# Patient Record
Sex: MALE | URBAN - METROPOLITAN AREA
[De-identification: names, ages, dates, MRNs, and addresses within clinical notes are randomized per-mention and may not be internally consistent; named-entity substitution may affect disease eponyms.]

---

## 2018-01-01 ENCOUNTER — OFFICE VISIT (OUTPATIENT)
Dept: HEMATOLOGY/ONCOLOGY | Facility: CLINIC | Age: 83
End: 2018-01-01
Payer: MEDICARE

## 2018-01-01 ENCOUNTER — DOCUMENTATION ONLY (OUTPATIENT)
Dept: RADIATION ONCOLOGY | Facility: CLINIC | Age: 83
End: 2018-01-01

## 2018-01-01 ENCOUNTER — TELEPHONE (OUTPATIENT)
Dept: HEMATOLOGY/ONCOLOGY | Facility: CLINIC | Age: 83
End: 2018-01-01

## 2018-01-01 ENCOUNTER — TELEPHONE (OUTPATIENT)
Dept: RADIATION ONCOLOGY | Facility: CLINIC | Age: 83
End: 2018-01-01

## 2018-01-01 ENCOUNTER — CLINICAL SUPPORT (OUTPATIENT)
Dept: HEMATOLOGY/ONCOLOGY | Facility: CLINIC | Age: 83
End: 2018-01-01
Payer: MEDICARE

## 2018-01-01 ENCOUNTER — OFFICE VISIT (OUTPATIENT)
Dept: RADIATION ONCOLOGY | Facility: CLINIC | Age: 83
End: 2018-01-01
Payer: MEDICARE

## 2018-01-01 VITALS
BODY MASS INDEX: 29.92 KG/M2 | SYSTOLIC BLOOD PRESSURE: 120 MMHG | WEIGHT: 185.38 LBS | HEART RATE: 57 BPM | TEMPERATURE: 98 F | RESPIRATION RATE: 20 BRPM | DIASTOLIC BLOOD PRESSURE: 66 MMHG

## 2018-01-01 VITALS
HEART RATE: 62 BPM | DIASTOLIC BLOOD PRESSURE: 64 MMHG | RESPIRATION RATE: 20 BRPM | WEIGHT: 181.19 LBS | DIASTOLIC BLOOD PRESSURE: 61 MMHG | HEART RATE: 59 BPM | BODY MASS INDEX: 29.2 KG/M2 | SYSTOLIC BLOOD PRESSURE: 177 MMHG | BODY MASS INDEX: 29.25 KG/M2 | WEIGHT: 180.88 LBS | SYSTOLIC BLOOD PRESSURE: 167 MMHG | TEMPERATURE: 98 F

## 2018-01-01 VITALS
TEMPERATURE: 98 F | HEART RATE: 67 BPM | SYSTOLIC BLOOD PRESSURE: 148 MMHG | RESPIRATION RATE: 18 BRPM | DIASTOLIC BLOOD PRESSURE: 57 MMHG | BODY MASS INDEX: 27.92 KG/M2 | WEIGHT: 173 LBS

## 2018-01-01 VITALS
HEART RATE: 55 BPM | DIASTOLIC BLOOD PRESSURE: 65 MMHG | TEMPERATURE: 99 F | BODY MASS INDEX: 28.47 KG/M2 | SYSTOLIC BLOOD PRESSURE: 179 MMHG | WEIGHT: 176.38 LBS | RESPIRATION RATE: 18 BRPM

## 2018-01-01 VITALS
SYSTOLIC BLOOD PRESSURE: 165 MMHG | RESPIRATION RATE: 18 BRPM | BODY MASS INDEX: 27.96 KG/M2 | WEIGHT: 173.19 LBS | DIASTOLIC BLOOD PRESSURE: 65 MMHG | HEART RATE: 61 BPM | TEMPERATURE: 98 F

## 2018-01-01 VITALS
BODY MASS INDEX: 30.25 KG/M2 | SYSTOLIC BLOOD PRESSURE: 149 MMHG | DIASTOLIC BLOOD PRESSURE: 61 MMHG | TEMPERATURE: 98 F | HEART RATE: 76 BPM | RESPIRATION RATE: 20 BRPM | WEIGHT: 187.38 LBS

## 2018-01-01 DIAGNOSIS — C15.9 SQUAMOUS CELL ESOPHAGEAL CANCER: Primary | ICD-10-CM

## 2018-01-01 DIAGNOSIS — R60.0 ARM EDEMA: ICD-10-CM

## 2018-01-01 DIAGNOSIS — C15.9 SQUAMOUS CELL ESOPHAGEAL CANCER: ICD-10-CM

## 2018-01-01 DIAGNOSIS — E07.9 THYROID DYSFUNCTION: Primary | ICD-10-CM

## 2018-01-01 LAB
ALBUMIN SERPL-MCNC: 3.2 G/DL (ref 3.1–4.7)
ALBUMIN SERPL-MCNC: 3.3 G/DL (ref 3.1–4.7)
ALP SERPL-CCNC: 48 IU/L (ref 40–104)
ALP SERPL-CCNC: 49 IU/L (ref 40–104)
ALT (SGPT): 18 IU/L (ref 3–33)
ALT (SGPT): 18 IU/L (ref 3–33)
AST SERPL-CCNC: 22 IU/L (ref 10–40)
AST SERPL-CCNC: 23 IU/L (ref 10–40)
BASOPHILS NFR BLD: 0 K/UL (ref 0–0.2)
BASOPHILS NFR BLD: 0 K/UL (ref 0–0.2)
BASOPHILS NFR BLD: 0.7 %
BASOPHILS NFR BLD: 1.3 %
BILIRUB SERPL-MCNC: 0.5 MG/DL (ref 0.3–1)
BILIRUB SERPL-MCNC: 0.6 MG/DL (ref 0.3–1)
BUN SERPL-MCNC: 43 MG/DL (ref 8–20)
BUN SERPL-MCNC: 51 MG/DL (ref 8–20)
CALCIUM SERPL-MCNC: 8.7 MG/DL (ref 7.7–10.4)
CALCIUM SERPL-MCNC: 8.9 MG/DL (ref 7.7–10.4)
CHLORIDE: 96 MMOL/L (ref 98–110)
CHLORIDE: 98 MMOL/L (ref 98–110)
CO2 SERPL-SCNC: 25.8 MMOL/L (ref 22.8–31.6)
CO2 SERPL-SCNC: 28.4 MMOL/L (ref 22.8–31.6)
CREATININE: 1.66 MG/DL (ref 0.6–1.4)
CREATININE: 2.04 MG/DL (ref 0.6–1.4)
EOSINOPHIL NFR BLD: 0.1 K/UL (ref 0–0.7)
EOSINOPHIL NFR BLD: 0.1 K/UL (ref 0–0.7)
EOSINOPHIL NFR BLD: 4.2 %
EOSINOPHIL NFR BLD: 4.4 %
ERYTHROCYTE [DISTWIDTH] IN BLOOD BY AUTOMATED COUNT: 15.6 % (ref 12.5–14.5)
ERYTHROCYTE [DISTWIDTH] IN BLOOD BY AUTOMATED COUNT: 16.7 % (ref 12.5–14.5)
GLUCOSE: 130 MG/DL (ref 70–99)
GLUCOSE: 66 MG/DL (ref 70–99)
GRAN #: 1.8 K/UL (ref 1.4–6.5)
GRAN #: 2.1 K/UL (ref 1.4–6.5)
GRAN%: 56.3 %
GRAN%: 67.4 %
HCT VFR BLD AUTO: 26.9 % (ref 39–55)
HCT VFR BLD AUTO: 27.3 % (ref 39–55)
HGB BLD-MCNC: 8.9 G/DL (ref 14–16)
HGB BLD-MCNC: 9 G/DL (ref 14–16)
IMMATURE GRANS (ABS): 0 K/UL (ref 0–1)
IMMATURE GRANS (ABS): 0 K/UL (ref 0–1)
IMMATURE GRANULOCYTES: 0.3 %
IMMATURE GRANULOCYTES: 0.6 %
LYMPH #: 0.4 K/UL (ref 1.2–3.4)
LYMPH #: 0.6 K/UL (ref 1.2–3.4)
LYMPH%: 11.4 %
LYMPH%: 19 %
MCH RBC QN AUTO: 29.9 PG (ref 25–35)
MCH RBC QN AUTO: 30.3 PG (ref 25–35)
MCHC RBC AUTO-ENTMCNC: 33 G/DL (ref 31–36)
MCHC RBC AUTO-ENTMCNC: 33.1 G/DL (ref 31–36)
MCV RBC AUTO: 90.3 FL (ref 80–100)
MCV RBC AUTO: 91.9 FL (ref 80–100)
MONO #: 0.5 K/UL (ref 0.1–0.6)
MONO #: 0.6 K/UL (ref 0.1–0.6)
MONO%: 16 %
MONO%: 18.4 %
NUCLEATED RBCS: 0 %
NUCLEATED RBCS: 0 %
NUCLEATED RED BLOOD CELLS: 0 /100 WBC
NUCLEATED RED BLOOD CELLS: 0 /100 WBC
PERFORMED BY:: ABNORMAL
PERFORMED BY:: ABNORMAL
PLATELET # BLD AUTO: 109 K/UL (ref 140–440)
PLATELET # BLD AUTO: 138 K/UL (ref 140–440)
PMV BLD AUTO: 8.5 FL (ref 8.8–12.7)
PMV BLD AUTO: 8.7 FL (ref 8.8–12.7)
POTASSIUM SERPL-SCNC: 4.3 MMOL/L (ref 3.5–5)
POTASSIUM SERPL-SCNC: 4.3 MMOL/L (ref 3.5–5)
PROT SERPL-MCNC: 6.1 G/DL (ref 6–8.2)
PROT SERPL-MCNC: 6.2 G/DL (ref 6–8.2)
RBC # BLD AUTO: 2.97 M/UL (ref 4.3–5.9)
RBC # BLD AUTO: 2.98 M/UL (ref 4.3–5.9)
SODIUM: 132 MMOL/L (ref 134–144)
SODIUM: 133 MMOL/L (ref 134–144)
T4 FREE SP9 P CHAL SERPL-SCNC: 0.66 NG/DL (ref 0.45–1.27)
TSH SERPL DL<=0.005 MIU/L-ACNC: 7.56 ULU/ML (ref 0.3–5.6)
WBC # BLD: 3.1 K/UL (ref 5–10)
WBC # BLD: 3.2 K/UL (ref 5–10)

## 2018-01-01 PROCEDURE — 99214 OFFICE O/P EST MOD 30 MIN: CPT | Mod: ,,, | Performed by: INTERNAL MEDICINE

## 2018-01-01 PROCEDURE — 99215 OFFICE O/P EST HI 40 MIN: CPT | Mod: ,,, | Performed by: RADIOLOGY

## 2018-01-01 RX ORDER — AMLODIPINE BESYLATE 10 MG/1
10 TABLET ORAL DAILY
COMMUNITY

## 2018-01-01 RX ORDER — HEPARIN 100 UNIT/ML
500 SYRINGE INTRAVENOUS
Status: CANCELLED | OUTPATIENT
Start: 2018-01-01

## 2018-01-01 RX ORDER — SULFAMETHOXAZOLE AND TRIMETHOPRIM 400; 80 MG/1; MG/1
1 TABLET ORAL 2 TIMES DAILY
Qty: 20 TABLET | Refills: 0 | Status: SHIPPED | OUTPATIENT
Start: 2018-01-01 | End: 2018-01-01

## 2018-01-01 RX ORDER — SODIUM CHLORIDE 0.9 % (FLUSH) 0.9 %
10 SYRINGE (ML) INJECTION
Status: CANCELLED | OUTPATIENT
Start: 2018-01-01

## 2018-01-01 RX ORDER — HYDROCODONE BITARTRATE AND ACETAMINOPHEN 7.5; 325 MG/1; MG/1
1 TABLET ORAL
Qty: 60 TABLET | Refills: 0 | Status: SHIPPED | OUTPATIENT
Start: 2018-01-01

## 2018-04-30 ENCOUNTER — TELEPHONE (OUTPATIENT)
Dept: RADIATION ONCOLOGY | Facility: CLINIC | Age: 83
End: 2018-04-30

## 2018-04-30 ENCOUNTER — OFFICE VISIT (OUTPATIENT)
Dept: RADIATION ONCOLOGY | Facility: CLINIC | Age: 83
End: 2018-04-30
Payer: MEDICARE

## 2018-04-30 VITALS — BODY MASS INDEX: 28.89 KG/M2 | WEIGHT: 190 LBS | DIASTOLIC BLOOD PRESSURE: 53 MMHG | SYSTOLIC BLOOD PRESSURE: 133 MMHG

## 2018-04-30 DIAGNOSIS — C15.9 SQUAMOUS CELL ESOPHAGEAL CANCER: ICD-10-CM

## 2018-04-30 DIAGNOSIS — C77.0 SECONDARY MALIGNANT NEOPLASM OF CERVICAL LYMPH NODE: Primary | ICD-10-CM

## 2018-04-30 PROCEDURE — 99205 OFFICE O/P NEW HI 60 MIN: CPT | Mod: ,,, | Performed by: RADIOLOGY

## 2018-04-30 RX ORDER — POTASSIUM CHLORIDE 750 MG/1
10 CAPSULE, EXTENDED RELEASE ORAL ONCE
COMMUNITY

## 2018-04-30 RX ORDER — FUROSEMIDE 20 MG/1
20 TABLET ORAL 2 TIMES DAILY
COMMUNITY

## 2018-04-30 RX ORDER — HYDRALAZINE HYDROCHLORIDE 50 MG/1
50 TABLET, FILM COATED ORAL 3 TIMES DAILY
COMMUNITY

## 2018-04-30 RX ORDER — ASPIRIN 81 MG/1
81 TABLET ORAL DAILY
COMMUNITY

## 2018-04-30 RX ORDER — FENOFIBRATE 160 MG/1
160 TABLET ORAL DAILY
COMMUNITY

## 2018-04-30 RX ORDER — CLONIDINE 0.2 MG/24H
1 PATCH, EXTENDED RELEASE TRANSDERMAL
COMMUNITY

## 2018-04-30 RX ORDER — TERAZOSIN 5 MG/1
5 CAPSULE ORAL NIGHTLY
COMMUNITY

## 2018-04-30 RX ORDER — HYDROCODONE BITARTRATE AND ACETAMINOPHEN 7.5; 325 MG/1; MG/1
1 TABLET ORAL
Qty: 60 TABLET | Refills: 0 | Status: SHIPPED | OUTPATIENT
Start: 2018-04-30 | End: 2018-05-16 | Stop reason: SDUPTHER

## 2018-04-30 RX ORDER — CITALOPRAM 20 MG/1
20 TABLET, FILM COATED ORAL DAILY
COMMUNITY

## 2018-04-30 RX ORDER — ESOMEPRAZOLE MAGNESIUM 40 MG/1
40 CAPSULE, DELAYED RELEASE ORAL
COMMUNITY

## 2018-04-30 RX ORDER — SPIRONOLACTONE 50 MG/1
50 TABLET, FILM COATED ORAL DAILY
COMMUNITY

## 2018-04-30 RX ORDER — ALPRAZOLAM 0.25 MG/1
0.25 TABLET ORAL 3 TIMES DAILY
COMMUNITY

## 2018-04-30 RX ORDER — INSULIN LISPRO 100 [IU]/ML
INJECTION, SOLUTION INTRAVENOUS; SUBCUTANEOUS
COMMUNITY

## 2018-04-30 NOTE — PATIENT INSTRUCTIONS
Patient given education and instructions on dental care. Radiation therapy to the Head and neck instruction sheet given along with skin care  Instructions.   GALLITO booklet given.  Patient and spouse verbalized understanding.

## 2018-04-30 NOTE — PROGRESS NOTES
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 Mg for sinus problems and a left-sided neck mass was discovered. He underwent a fine-needle aspirate of the neck mass making a diagnosis squamous cell carcinoma.  He presented to Dr. Foy, for workup which included a PET scan dated 04/25/2018. This study revealed multiple lymph nodes in the neck specifically to large hypermetabolic level II nodes consistent with neoplastic disease a CT scan was also performed which showed lymphadenopathy at level IB measuring 2.8 cm level II measuring 3.5 cm and level III measuring 2.5 cm. The PET scan also confirmed a soft tissue intraluminal esophageal mass at the C6-C7 level measuring 3.4 x 2.9 cm with an SUV of 9.9 consistent with a neoplastic process.  There is no evidence of distant metastatic disease.    Clinically he is able to eat a fairly normal diet. He does not have any significant symptoms of dysphasia, no coughing up blood, moderate pain in the posterior head area possibly referred, requiring hydrocodone 7.5. He has noted that the neck mass has increased in size in a short period of time.  He doesn't describe any shortness of breath fevers chills or cough. He has borderline end-stage renal disease but not on dialysis.      Review of Systems   Constitutional: Negative for appetite change and chills.   HENT:   Positive for lump/mass. Negative for mouth sores.    Eyes: Negative for eye problems and icterus.   Respiratory: Negative for chest tightness and cough.    Cardiovascular: Negative for chest pain and leg swelling.   Gastrointestinal: Negative for abdominal distention and abdominal pain.    Genitourinary: Negative for difficulty urinating and dysuria.    Musculoskeletal: Positive for back pain and neck pain. Negative for arthralgias.   Skin: Negative for itching and rash.   Neurological: Negative for extremity weakness and speech difficulty.   Hematological: Negative for adenopathy. Does not bruise/bleed easily.   Psychiatric/Behavioral: Negative for confusion. The patient is not nervous/anxious.      Past Medical History:   Diagnosis Date    Anemia     Arthritis     Cancer     neck    CHF (congestive heart failure)     Diabetes mellitus     Disorder of kidney and ureter      Past Surgical History:   Procedure Laterality Date    APPENDECTOMY      BOWEL RESECTION      CORONARY ANGIOPLASTY WITH STENT PLACEMENT      INGUINAL HERNIA REPAIR      NH CABG, ARTERY-VEIN, FOUR      Coronary Artery Bypass, 4    PROSTATE SURGERY       Social History     Social History    Marital status:      Spouse name: N/A    Number of children: N/A    Years of education: N/A     Social History Main Topics    Smoking status: Never Smoker    Smokeless tobacco: Never Used    Alcohol use No    Drug use: Unknown    Sexual activity: Not Asked     Other Topics Concern    None     Social History Narrative    None     History reviewed. No pertinent family history.    PRIOR HISTORY OF CHEMOTHERAPY OR RADIOTHERAPY: Please see HPI for patients prior oncologic history.    Medication List with Changes/Refills   Current Medications    ALPRAZOLAM (XANAX) 0.25 MG TABLET    Take 0.25 mg by mouth 3 (three) times daily.    ASPIRIN (ECOTRIN) 81 MG EC TABLET    Take 81 mg by mouth once daily.    ATORVASTATIN CALCIUM (LIPITOR ORAL)    Take by mouth.    CITALOPRAM (CELEXA) 20 MG TABLET    Take 20 mg by mouth once daily.    CLONIDINE 0.2 MG/24 HR TD PTWK (CATAPRES) 0.2 MG/24 HR    Place 1 patch onto the skin every 7 days.    EPOETIN JOE (PROCRIT) 10,000 UNIT/ML INJECTION    Inject into the skin.    ESOMEPRAZOLE (NEXIUM)  40 MG CAPSULE    Take 40 mg by mouth before breakfast.    FENOFIBRATE 160 MG TAB    Take 160 mg by mouth once daily.    FUROSEMIDE (LASIX) 20 MG TABLET    Take 20 mg by mouth 2 (two) times daily.    HYDRALAZINE (APRESOLINE) 50 MG TABLET    Take 50 mg by mouth 3 (three) times daily.    INSULIN LISPRO (HUMALOG) 100 UNIT/ML INJECTION    Inject into the skin 3 (three) times daily before meals.    LINAGLIPTIN (TRADJENTA) 5 MG TAB TABLET    Take 5 mg by mouth once daily.    POTASSIUM CHLORIDE (MICRO-K) 10 MEQ CPSR    Take 10 mEq by mouth once.    SPIRONOLACTONE (ALDACTONE) 50 MG TABLET    Take 50 mg by mouth once daily.    TERAZOSIN (HYTRIN) 5 MG CAPSULE    Take 5 mg by mouth every evening.    TRAVOPROST, BENZALKONIUM, (TRAVATAN) 0.004 % OPHTHALMIC SOLUTION    1 drop every evening.     Review of patient's allergies indicates:   Allergen Reactions    Ceftin [cefuroxime axetil]     Codeine     Levaquin [levofloxacin]     Prednisone     Remeron [mirtazapine]     Ultracet [tramadol-acetaminophen]        QUALITY OF LIFE: 80%- Normal Activity with Effort: Some Symptoms of Disease    Vitals:    04/30/18 1356   BP: (!) 133/53   Weight: 86.2 kg (190 lb)   PainSc: 0-No pain       PHYSICAL EXAM:   GENERAL: alert; in no apparent distress.   HEAD: normocephalic, atraumatic.  Neck- large palpable mass in the right neck level II measuring approxi-4 cm  EYES: pupils are equal, round, reactive to light and accommodation. Sclera anicteric. Conjunctiva not injected.   NOSE/THROAT: no nasal erythema or rhinorrhea. Oropharynx pink, without erythema, ulcerations or thrush.   NECK: no cervical motion rigidity; supple with no masses.  CHEST: clear to auscultation bilaterally; no wheezes, crackles or rubs. Patient is speaking comfortably on room air with normal work of breathing without using accessory muscles of respiration.  CARDIOVASCULAR: regular rate and rhythm; no murmurs, rubs or gallops.  ABDOMEN: soft, nontender, nondistended.  Bowel sounds present.   MUSCULOSKELETAL: no tenderness to palpation along the spine or scapulae. Normal range of motion.  NEUROLOGIC: cranial nerves II-XII intact bilaterally. Strength 5/5 in bilateral upper and lower extremities. No sensory deficits appreciated. Reflexes globally intact. No cerebellar signs. Normal gait.  LYMPHATIC: no cervical, supraclavicular or axillary adenopathy appreciated bilaterally.   EXTREMITIES: no clubbing, cyanosis, edema.  SKIN: no erythema, rashes, ulcerations noted.     REVIEW OF IMAGING/PATHOLOGY/LABS: Please see HPI. All images reviewed personally by dictating physician.     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 It appears he does have a proximal esophageal tumor noting that the SUV on PET scan was almost 10, and lesion measuring almost 3-1/2 cm, noting that it has significantly increased in size of a short period of time.  I believe it has metastasized to his neck nodes.  To complete his workup I do recommend the followin medical oncology referral  2 GI referral for investigation of the proximal esophagus with biopsy and possible for placement of PEG tube, noting that his esophagus could close off.  3 dental evaluation, for dental clearance prior to radiation therapy    Because of his age and comorbidities his systemic options may be limited however we will go ahead make referral for and be seen by one of our medical oncologist.     I had a very careful discussion about the side effects of treatment including not limited to early effects of soreness of the skin and throat requiring diet supplementation through PEG tube placement, changes in the oral cavity such as permanent dryness because of the  effect on salivary glands affecting his dental health and henceforth dental clearance to be done prior to treatment.  I discussed long-term effects of treatment including scar tissue discoloration of the skin tightening of the tissues of the skin and neck, possible scarring of the esophagus and possibly long-term if not permanent PEG tube nutritional support requirement.    In terms of radiation program I do recommend treatment to bilateral necks with PET-positive disease receiving 70 gray uninvolved lymph nodes bilaterally receiving approximate 50-56 gray, and esophageal mass I do recommend treatment to 50-60 gray.    Addendum: The patient has undergone dental clearance. In addition he was seen by Dr. Arreguin, and a PEG tube has been placed.  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      We discussed the risks and benefits of the above treatment and have gone over in detail the acute and late toxicities of radiation therapy to the neck and esoph. The patient expressed  understanding and has signed a consent form which is included in the patients chart. The patient has our contact information and understands that they are free to contact us at any time with questions or concerns regarding radiation therapy.    DISPOSITION: RTC FOR CT SIM    TIME SPENT WITH PATIENT: I have personally seen and evaluated this patient. Greater than 50% of this time was spent discussing coordination of care and/or counseling.     PHYSICIAN: Andrews Denis MD

## 2018-05-08 ENCOUNTER — TELEPHONE (OUTPATIENT)
Dept: RADIATION ONCOLOGY | Facility: CLINIC | Age: 83
End: 2018-05-08

## 2018-05-08 ENCOUNTER — DOCUMENTATION ONLY (OUTPATIENT)
Dept: RADIATION ONCOLOGY | Facility: CLINIC | Age: 83
End: 2018-05-08

## 2018-05-08 NOTE — TELEPHONE ENCOUNTER
DIETITIAN INTRODUCTION    Called wife yesterday to introduce myself and let her know that I would be closely working with she and her  during treatment.    He got a feeding tube and his wife has been taught by [facility] RN on how to flush and clean around the PEG site.  She feels comfortable flushing PEG.      At this time, Mr. Stanton is able to eat solid foods and ate a steak yesterday with no difficulty.  He is a diabetic and blood sugars run between 130 - 160 per his wife.  He has stage 4 CRF.    Plan: Gave wife RD contact information.  2. Will meet with wife and Mr. Stanton after simulation tomorrow.  3. Will send referral to [employer] for tube feeding supplies.  4. Advised wife to have patient overeat before treatment by adding more fat to the diet in the way of cream, butter, avocados, oils and contreras.

## 2018-05-08 NOTE — PROGRESS NOTES
Con Stanton  [id number]  8/14/1930 5/8/2018  No referring provider defined for this encounter.    DIAGNOSIS: Metastatic esophageal proximal squamous cell carcinoma  TREATMENT SITE(S): Proximal esophagus and bilateral neck nodes    INTENT: CURATIVE    TREATMENT SETTING: RT ALONE     MODALITY: PHOTON    TECHNIQUE:  INTENSITY MODULATED RADIOTHERAPY (IMRT)    IMRT MEDICAL NECESSITY:IMRT MEDICAL NECESSITY: Target volume irregular shape/proximate to critical structures, Narrow margins needed to protect adjacent structures and High precision necessary     HPI: 87-year-old gentleman with squamous cell cancer of the proximal esophagus with adenopathy of the neck, bulky on the left, with significant comorbidities, including borderline end-stage renal disease    I have personally performed treatment planning for the patient, reviewing relevant history/physical and imaging. I have defined GTV, CTV, PTV and organs at risk.     In order to accomplish this plan, I am ordering:  SIMULATION: CT SIMULATION FOR PLACEMENT OF TREATMENT FIELDS    CONTRAST: none    TO ACCOMPLISH REPRODUCIBLE POSITION: AQUAPLAST MASK    DEVICES FOR BEAM SHAPING: CUSTOMIZED MLC    CUSTOMIZED BOLUS: none    IMAGING: CBCT DAILY    I have ordered a weekly physics check.  SPECIAL PHYSICS CONSULT: NO  REASON: N/A    SPECIAL TREATMENT CIRCUMSTANCE: NO   Concurrent or recent administration of chemotherapeutic agents which are  known potent radiosensitizers and thus will require vigilant monitoring for  exaggerated radiation toxicities.    LABS: NONE    ANTICIPATED PRESCRIPTION: 70 grade PET positive left neck nodes, 56 gray to bilateral uninvolved neck nodes, 60 gray to primary tumor site and proximal esophagus    TREATMENT: DAILY    PHYSICIAN: Andrews Denis MD

## 2018-05-09 ENCOUNTER — OFFICE VISIT (OUTPATIENT)
Dept: HEMATOLOGY/ONCOLOGY | Facility: CLINIC | Age: 83
End: 2018-05-09
Payer: MEDICARE

## 2018-05-09 VITALS
HEIGHT: 68 IN | DIASTOLIC BLOOD PRESSURE: 72 MMHG | BODY MASS INDEX: 28.62 KG/M2 | WEIGHT: 188.88 LBS | SYSTOLIC BLOOD PRESSURE: 156 MMHG | RESPIRATION RATE: 18 BRPM | TEMPERATURE: 98 F | HEART RATE: 83 BPM

## 2018-05-09 DIAGNOSIS — C15.9 SQUAMOUS CELL ESOPHAGEAL CANCER: Primary | ICD-10-CM

## 2018-05-09 PROCEDURE — 99204 OFFICE O/P NEW MOD 45 MIN: CPT | Mod: ,,, | Performed by: INTERNAL MEDICINE

## 2018-05-09 NOTE — PROGRESS NOTES
[facility] Hematolgy/Oncology  History & Physical    Subjective:      Patient ID:   NAME: Con Stanton : 1930     87 y.o. male    Referring Doc: Andrews Denis  Other Physicians: Sonja ([facility]-ENT), Soto Mg (ENT); Regan Mahajan (PCP); Caroline Toledo        Chief Complaint: newly diagnosed metastatic squamous cell esophageal CA      HPI:  87 y.o. male with diagnosis of newly diagnosed metastatic squamous cell esophageal CA  who has been referred by Dr Andrews Denis for evaluation by medical hematology/oncology. He originally presented with an enlarging neck mass and was seen by Dr Mg and had an FNA which showed metastatic squamous cell carcinoma. He subsequently saw Dr Casillas at [facility] ENT and had laryngoscope which was essentially unremarkable. PET scan was done on 2018 with discovery of an upper esophageal primary mass.  He is of advanced age and has numerous co-morbidities including ESRD and CHF. He saw Dr Arreguin with GI has had EGD on Friday with peg tube placement and biopsy of the mass. He is here with his wife, two sons and daughter-in-law. He denies any CP, SOB, HA's or N/V. I discussed the poor prognostics with regard to esophageal cancers in general with the patient and his family.               ROS:   GEN: normal without any fever, night sweats or weight loss  HEENT: normal with no HA's, sore throat, stiff neck, changes in vision  CV: normal with no CP, SOB, PND, SCHUMACHER or orthopnea  PULM: normal with no SOB, cough, hemoptysis, sputum or pleuritic pain  GI: normal with no abdominal pain, nausea, vomiting, constipation, diarrhea, melanotic stools, BRBPR, or hematemesis  : normal with no hematuria, dysuria  BREAST: normal with no mass, discharge, pain  SKIN: normal with no rash, erythema, bruising, or swelling       Past Medical/Surgical History:  Past Medical History:   Diagnosis Date    Anemia     Arthritis     Cancer     neck    CHF (congestive heart failure)      Diabetes mellitus     Disorder of kidney and ureter      Past Surgical History:   Procedure Laterality Date    APPENDECTOMY      BOWEL RESECTION      CORONARY ANGIOPLASTY WITH STENT PLACEMENT      INGUINAL HERNIA REPAIR      CT CABG, ARTERY-VEIN, FOUR      Coronary Artery Bypass, 4    PROSTATE SURGERY           Allergies:  Review of patient's allergies indicates:   Allergen Reactions    Ceftin [cefuroxime axetil]     Codeine     Levaquin [levofloxacin]     Prednisone     Remeron [mirtazapine]     Ultracet [tramadol-acetaminophen]        Social/Family History:  Social History     Social History    Marital status:      Spouse name: N/A    Number of children: N/A    Years of education: N/A     Occupational History    Not on file.     Social History Main Topics    Smoking status: Never Smoker    Smokeless tobacco: Never Used    Alcohol use No    Drug use: Unknown    Sexual activity: Not on file     Other Topics Concern    Not on file     Social History Narrative    No narrative on file     No family history on file.      Medications:    Current Outpatient Prescriptions:     ALPRAZolam (XANAX) 0.25 MG tablet, Take 0.25 mg by mouth 3 (three) times daily., Disp: , Rfl:     aspirin (ECOTRIN) 81 MG EC tablet, Take 81 mg by mouth once daily., Disp: , Rfl:     atorvastatin calcium (LIPITOR ORAL), Take by mouth., Disp: , Rfl:     citalopram (CELEXA) 20 MG tablet, Take 20 mg by mouth once daily., Disp: , Rfl:     cloNIDine 0.2 mg/24 hr td ptwk (CATAPRES) 0.2 mg/24 hr, Place 1 patch onto the skin every 7 days., Disp: , Rfl:     epoetin sunitha (PROCRIT) 10,000 unit/mL injection, Inject into the skin., Disp: , Rfl:     esomeprazole (NEXIUM) 40 MG capsule, Take 40 mg by mouth before breakfast., Disp: , Rfl:     fenofibrate 160 MG Tab, Take 160 mg by mouth once daily., Disp: , Rfl:     furosemide (LASIX) 20 MG tablet, Take 20 mg by mouth 2 (two) times daily., Disp: , Rfl:     hydrALAZINE  "(APRESOLINE) 50 MG tablet, Take 50 mg by mouth 3 (three) times daily., Disp: , Rfl:     hydrocodone-acetaminophen 7.5-325mg (NORCO) 7.5-325 mg per tablet, Take 1 tablet by mouth every 4 to 6 hours as needed for Pain., Disp: 60 tablet, Rfl: 0    insulin lispro (HUMALOG) 100 unit/mL injection, Inject into the skin 3 (three) times daily before meals., Disp: , Rfl:     linagliptin (TRADJENTA) 5 mg Tab tablet, Take 5 mg by mouth once daily., Disp: , Rfl:     potassium chloride (MICRO-K) 10 MEQ CpSR, Take 10 mEq by mouth once., Disp: , Rfl:     spironolactone (ALDACTONE) 50 MG tablet, Take 50 mg by mouth once daily., Disp: , Rfl:     terazosin (HYTRIN) 5 MG capsule, Take 5 mg by mouth every evening., Disp: , Rfl:     travoprost, benzalkonium, (TRAVATAN) 0.004 % ophthalmic solution, 1 drop every evening., Disp: , Rfl:       Pathology:    Left Neck FNA: 4/10/2018  FINAL DIAGNOSIS:  LEFT CERVICAL (NECK) MASS, NEEDLE BIOPSY:    - MODERATELY DIFFERENTIATED SQUAMOUS CELL CARCINOMA.      Objective:   Vitals:  Blood pressure (!) 156/72, pulse 83, temperature 98.4 °F (36.9 °C), resp. rate 18, height 5' 8" (1.727 m), weight 85.7 kg (188 lb 14.4 oz).    Physical Examination:   GEN: no apparent distress, comfortable; AAOx3  HEAD: atraumatic and normocephalic  EYES: no pallor, no icterus, PERRLA  ENT: OMM, no pharyngeal erythema, external ears WNL; no nasal discharge; no thrush  NECK: positive mass/LN; thyroid normal, trachea midline, no LAD/LN's, supple  CV: RRR with II/IV OCHOA murmur; normal pulse; normal S1 and S2; no pedal edema  CHEST: Normal respiratory effort; CTAB; normal breath sounds; no wheeze or crackles  ABDOM: nontender and nondistended; soft; normal bowel sounds; no rebound/guarding  MUSC/Skeletal: arthropathy  EXTREM: no clubbing, cyanosis, inflammation or swelling  SKIN: no rashes, lesions, ulcers, petechiae or subcutaneous nodules  : no gregg  NEURO: grossly intact; motor/sensory WNL; AAOx3; no " tremors  PSYCH: normal mood, affect and behavior  LYMPH: normal cervical, supraclavicular, axillary and groin LN's      Labs:     None available currently      Radiology/Diagnostic Studies:    CT and PET on chart      All lab results and imaging results have been reviewed and discussed with the patient    Assessment:   (1) 87 y.o. male with diagnosis of newly diagnosed metastatic squamous cell esophageal CA  who has been referred by Dr Andrews Denis for evaluation by medical hematology/oncology. He originally presented with an enlarging neck mass and was seen by Dr Mg and had an FNA which showed metastatic squamous cell carcinoma. He subsequently saw Dr Casillas at [facility] ENT and had laryngoscope which was essentially unremarkable. PET scan was done on 4/25/2018 with discovery of an upper esophageal primary mass which is suspected primary origin.      - CT scan was also performed which showed lymphadenopathy at level IB measuring 2.8 cm level II measuring 3.5 cm and level III measuring 2.5 cm. The PET scan also confirmed a soft tissue intraluminal esophageal mass at the C6-C7 level measuring 3.4 x 2.9 cm with an SUV of 9.9 consistent with a neoplastic process.    - he had EGD this past Friday with Dr Arreguin and had biopsy of the esophageal mass and peg tube placed    - He is of advanced age and has numerous co-morbidities including ESRD and CHF. I do not feel he would be a good candidate for chemotherapy or combined modality therapy. Immunologic therapy with Opdivo or Keytruda has not been approved for first line use in Esophageal Squamous Cell carcinomas, but will re-evaluate post monotherapy XRT.     - I reviewed the latest NCCN data version 1.2018    (2) ESRD - no on HD - followed by Dr Winkler    (3) DM    (4) CHF and CAD s/p CABG x4 in past - followed by Dr Pepper  - he saw Dr Pepper last week    (5) Chronic anemia - most likely multifactorial process with underlying anemia of chronic disorders and  anemia of chronic renal disease at least  - he is on procrit    (6) Urologic/prostate issues - followed by Dr Toledo - ureter issues; elevated PSA            Plan:       Squamous cell esophageal cancer            PLAN:  1. Await biopsy report from latest EGD done this past Friday   2. Proceed with monotherapy with XRT for now  3. Re-evaluate any possible consideration for immunologics afterwards  4. RTC in  4-6 weeks   Fax note to Keyshawn Denis Tveit, Morales, Neitzschman      I reviewed and discussed the pathology report(s) in depth with the patient and went over the patient's individual diagnosis based on the information that was currently available. I discussed the TNM staging process with regard to the patient's particular cancer type, and the calculated stage based on the currently available TNM data. I discussed the available prognostic data with regard to the current staging information and how it relates to the prognosis of their particular neoplastic process.        I have explained and the patient understands all of  the current recommendation(s). I have answered all of their questions to the best of my ability and to their complete satisfaction.             Thank you for allowing me to participate in this patient's care. Please call with any questions or concerns.    Electronically signed Huey Nieto MD

## 2018-05-10 ENCOUNTER — TELEPHONE (OUTPATIENT)
Dept: HEMATOLOGY/ONCOLOGY | Facility: CLINIC | Age: 83
End: 2018-05-10

## 2018-05-10 ENCOUNTER — DOCUMENTATION ONLY (OUTPATIENT)
Dept: RADIATION ONCOLOGY | Facility: CLINIC | Age: 83
End: 2018-05-10

## 2018-05-10 NOTE — PROGRESS NOTES
NUTRITION NOTE    Mr. Stanton was here yesterday with his son and wife for his simulation.  He continues to eat well and has not lost any weight.  At this time he is only getting radiation.  Weight: 188#  He recently had some bouts with narcotic induced constipation.  He has several co- morbidities including stage 3 CRI, CHF and diabetes.  He is being followed by a speech therapist and will share his cancer diagnosis with her.       Plan: 1. Educated on the Pre-renal diet since he has a history of stage 4 CRI.  Advised that he be mindful of not overeating protein foods and limit protein to 60 -70 grams daily.  Reminded to continue to avoid using salt and high sodium foods.  2. Discussed the importance of nutrition and advised he aim for  2200 - 2600 calories daily.  Advised he should add healthy fats to get in extra calories.  He may need to eat cream, butter, gravies and contreras, for calories if he begins to lose weight  3. Advised he aim for 7-8 cups of fluid daily (25cc/kg adjusted body weight, hx of CHF).  4. Educated on the Fiber One/Senokot-S protocol for constipation.  Advised he could take Miralax daily if he continues with constipation.  5. Gave list of soft food ideas and blenderized recipes.  6. Advised to use baking soda/salt water rinses.  7. Will send referral for Lymphedema screening.   8. Follow with [facility] home health speech therapist, may need a baseline barium swallow study.  9. Since he is diabetic advised that he check his blood sugars more regularly.  Reviewed treatment for hypoglycemia.

## 2018-05-10 NOTE — TELEPHONE ENCOUNTER
Called pts family to tell them Dr. claros got final path report and that it was he thought it was

## 2018-05-16 DIAGNOSIS — C15.9 SQUAMOUS CELL ESOPHAGEAL CANCER: ICD-10-CM

## 2018-05-16 RX ORDER — HYDROCODONE BITARTRATE AND ACETAMINOPHEN 7.5; 325 MG/1; MG/1
1 TABLET ORAL
Qty: 60 TABLET | Refills: 0 | Status: SHIPPED | OUTPATIENT
Start: 2018-05-16 | End: 2018-06-15 | Stop reason: SDUPTHER

## 2018-05-22 ENCOUNTER — DOCUMENTATION ONLY (OUTPATIENT)
Dept: RADIATION ONCOLOGY | Facility: CLINIC | Age: 83
End: 2018-05-22

## 2018-05-22 NOTE — PROGRESS NOTES
NUTRITION NOTE    Mr. Stanton got his 4th radiation treatment today.  He has no eating issues other that a sore throat he got from being an overachiever with his swallowing exercises he was taught by his speech therapist.  Continues to manage his fluid intake by weighing regularly and taking diuretics per protocol given by his cardiologist.  His fluid needs are for 7-8 cups/day = 1850 cc (25cc/kg. adjusted body weight). Weight is stable at 189.5#.      Plan: Advised that he needed to overeat in anticipation of losing weight and while he can still taste food.  2. Advised he add fat liberally for calories to his foods.  3. Continue to flush PEG twice daily.  4. Called his Home Health Outpatient speech therapist and she states that he has not had a swallow study because the bedside swallow study shows no indications that warrant need for the study.  She will keep me posted as to how he is swallowing and  when she feels he may need an MBBS.  5. Will send in referral for tube feeding formula when he needs supplies.

## 2018-05-24 DIAGNOSIS — C15.9 SQUAMOUS CELL ESOPHAGEAL CANCER: Primary | ICD-10-CM

## 2018-05-24 RX ORDER — LIDOCAINE HYDROCHLORIDE 20 MG/ML
SOLUTION OROPHARYNGEAL EVERY 4 HOURS
Qty: 100 ML | Refills: 5 | Status: SHIPPED | OUTPATIENT
Start: 2018-05-24 | End: 2018-07-12 | Stop reason: SDUPTHER

## 2018-05-29 ENCOUNTER — TELEPHONE (OUTPATIENT)
Dept: HEMATOLOGY/ONCOLOGY | Facility: CLINIC | Age: 83
End: 2018-05-29

## 2018-05-29 DIAGNOSIS — C15.9 SQUAMOUS CELL ESOPHAGEAL CANCER: Primary | ICD-10-CM

## 2018-05-30 DIAGNOSIS — L58.9 RADIATION DERMATITIS: Primary | ICD-10-CM

## 2018-05-30 LAB
ALBUMIN SERPL-MCNC: 3.1 G/DL (ref 3.1–4.7)
ALP SERPL-CCNC: 33 IU/L (ref 40–104)
ALT (SGPT): 22 IU/L (ref 3–33)
AST SERPL-CCNC: 29 IU/L (ref 10–40)
BASOPHILS NFR BLD: 0 K/UL (ref 0–0.2)
BASOPHILS NFR BLD: 0.8 %
BILIRUB SERPL-MCNC: 0.4 MG/DL (ref 0.3–1)
BUN SERPL-MCNC: 38 MG/DL (ref 8–20)
CALCIUM SERPL-MCNC: 9 MG/DL (ref 7.7–10.4)
CHLORIDE: 97 MMOL/L (ref 98–110)
CO2 SERPL-SCNC: 25.3 MMOL/L (ref 22.8–31.6)
CREATININE: 1.6 MG/DL (ref 0.6–1.4)
EOSINOPHIL NFR BLD: 0.1 K/UL (ref 0–0.7)
EOSINOPHIL NFR BLD: 2.5 %
ERYTHROCYTE [DISTWIDTH] IN BLOOD BY AUTOMATED COUNT: 14.3 % (ref 12.5–14.5)
GLUCOSE: 160 MG/DL (ref 70–99)
GRAN #: 3.6 K/UL (ref 1.4–6.5)
GRAN%: 75.6 %
HCT VFR BLD AUTO: 32.9 % (ref 39–55)
HGB BLD-MCNC: 10.6 G/DL (ref 14–16)
IMMATURE GRANS (ABS): 0 K/UL (ref 0–1)
IMMATURE GRANULOCYTES: 0.2 %
LYMPH #: 0.5 K/UL (ref 1.2–3.4)
LYMPH%: 9.6 %
MCH RBC QN AUTO: 28 PG (ref 25–35)
MCHC RBC AUTO-ENTMCNC: 32.2 G/DL (ref 31–36)
MCV RBC AUTO: 86.8 FL (ref 80–100)
MONO #: 0.5 K/UL (ref 0.1–0.6)
MONO%: 11.3 %
NUCLEATED RBCS: 0 %
NUCLEATED RED BLOOD CELLS: 0 /100 WBC
PERFORMED BY:: ABNORMAL
PLATELET # BLD AUTO: 172 K/UL (ref 140–440)
PMV BLD AUTO: 8.7 FL (ref 8.8–12.7)
POTASSIUM SERPL-SCNC: 4.9 MMOL/L (ref 3.5–5)
PROT SERPL-MCNC: 6.4 G/DL (ref 6–8.2)
RBC # BLD AUTO: 3.79 M/UL (ref 4.3–5.9)
SODIUM: 130 MMOL/L (ref 134–144)
WBC # BLD: 4.8 K/UL (ref 5–10)

## 2018-05-31 ENCOUNTER — DOCUMENTATION ONLY (OUTPATIENT)
Dept: RADIATION ONCOLOGY | Facility: CLINIC | Age: 83
End: 2018-05-31

## 2018-05-31 NOTE — PROGRESS NOTES
NUTRITION FOLLOW UP    Mr. Stanton got his 11th out of 33 radiation treatments today.  He complains of jaw pain and mucositis.  He is not eating well and his weight is down 4# at 185.5%. He is also very fatigued.  He was in the hospital this week because of     Plan: Advised on 5/29  that he is losing weight and it is time to use his PEG. 2. Advised that he will not be as fatigued if he is not losing weight. 3. Reviewed how to use baking soda/salt rinses to help with mucositis. 4.Recommended he try Oral Balance.

## 2018-05-31 NOTE — PROGRESS NOTES
TUBE FEEDING - NUTRITION NOTE    Problem: CRF and diabetes, obtaining the appropriate formula.    Plan: Would recommend Suplena as tube feeding of choice, because it is recommended for kidney patients not on dialysis and it is suitable for diabetics.  However because it is a specialized tube feeding and more difficult to obtain, will try Osmolite 1.5 and Glucena first and monitor labs and blood sugars.    2. Gave son tube feeding schedule to try.  3. Advised to check blood sugars more regularly.  4. Should he lose weight and/or labs become elevated with these formulas will need to change to Suplena,  It has 425 calories/can, which is also an advantage to this formula.  5.  Follow up next week.

## 2018-06-06 DIAGNOSIS — C15.9 SQUAMOUS CELL ESOPHAGEAL CANCER: Primary | ICD-10-CM

## 2018-06-06 RX ORDER — HYDROCODONE BITARTRATE AND ACETAMINOPHEN 7.5; 325 MG/15ML; MG/15ML
15 SOLUTION ORAL
Qty: 750 ML | Refills: 0 | Status: SHIPPED | OUTPATIENT
Start: 2018-06-06 | End: 2018-07-12 | Stop reason: SDUPTHER

## 2018-06-07 ENCOUNTER — DOCUMENTATION ONLY (OUTPATIENT)
Dept: RADIATION ONCOLOGY | Facility: CLINIC | Age: 83
End: 2018-06-07

## 2018-06-07 NOTE — PROGRESS NOTES
NUTRITION NOTE    Mr. Dill got his 17th treatment today, [facility] through radiation.  Weight: 182.2# down 6# since starting treatment = 3% loss.  He is not using his feeing tube and getting 100% of his nutrition orally.  He is having some difficulty swallowing, but states he is using  lidocaine and that seems to help.  He is using the baking soda/salt rinses.  He complains of being fatigued and having dry mouth.    Plan: Advised Mr. Dill and his son that since he is losing some weight, that he will need to start using his PEG if weight loss continues.  2. Sent referral to [facility] for enteral supplies.  He will need a renal supplement called Suplena, 5 cans daily providin calories.  3. Reminded of the need for fluids, especially with dry mouth.  Advised he try sonic ice, sucking on sugar free candies, getting a mister, Biotene spray and continuing the baking soda/salt rinses.  4. He is going for blood work today for his chronic anemia.  (takes procrit).

## 2018-06-07 NOTE — PROGRESS NOTES
NUTRITION NOTE - TUBE FEEDING SUPPLIES    Faxed referral for tube feeding supplies to Nidia at [facility].

## 2018-06-13 ENCOUNTER — OFFICE VISIT (OUTPATIENT)
Dept: HEMATOLOGY/ONCOLOGY | Facility: CLINIC | Age: 83
End: 2018-06-13
Payer: MEDICARE

## 2018-06-13 VITALS
TEMPERATURE: 98 F | WEIGHT: 180.19 LBS | HEART RATE: 65 BPM | DIASTOLIC BLOOD PRESSURE: 75 MMHG | SYSTOLIC BLOOD PRESSURE: 196 MMHG | BODY MASS INDEX: 27.4 KG/M2 | RESPIRATION RATE: 18 BRPM

## 2018-06-13 DIAGNOSIS — C15.9 SQUAMOUS CELL ESOPHAGEAL CANCER: Primary | ICD-10-CM

## 2018-06-13 PROCEDURE — 99214 OFFICE O/P EST MOD 30 MIN: CPT | Mod: ,,, | Performed by: INTERNAL MEDICINE

## 2018-06-13 NOTE — LETTER
June 13, 2018      Regan Mahajan MD  [address]  [facility] [address]           [facility] - Hematology Oncology  [address]  [address]  [facility] [address]  Phone: [phone]  Fax: [phone]          Patient: Con Stanton   MR Number: [id number]   YOB: 1930   Date of Visit: 6/13/2018       Dear Dr. Regan Mahajan:    Thank you for referring Con Stanton to me for evaluation. Attached you will find relevant portions of my assessment and plan of care.    If you have questions, please do not hesitate to call me. I look forward to following Con Stanton along with you.    Sincerely,    Huey Nieto MD    Enclosure  CC:  No Recipients    If you would like to receive this communication electronically, please contact [email or web address] or [phone] to request more information on for; to (do) Centers Link access.    For providers and/or their staff who would like to refer a patient to [facility], please contact us through our one-stop-shop provider referral line, [facility], at [phone].    If you feel you have received this communication in error or would no longer like to receive these types of communications, please e-mail [email or web address]

## 2018-06-13 NOTE — PROGRESS NOTES
[facility] Hematology/Oncology  PROGRESS NOTE - 2nd Follow-up Visit      Subjective:       Patient ID:   NAME: Con Stanton : 1930     87 y.o. male    Referring Doc: Cira  Other Physicians: Sonja ([place]-ENT), Soto Mg (ENT); Regan Mahajan (PCP); Grayson; Evgeny; Kallie Toledo    Chief Complaint:  Sq esophageal ca f/u    History of Present Illness:     Patient returns today for a 2nd regularly scheduled follow-up visit.  The patient is here today to go over the results of the recently ordered labs, tests and studies. He has been on monotherapy with XRT per direction of Dr Denis. He was recently hospitalized briefly at [facility] and required blood transfusion. He is here with his son and wife. He saw Dr Hunt earlier today. He is still able to eat some foods. He is breathing ok. He denies any CP, SOB, HA's or N/V. He saw Dr Galicia yesterday.             ROS:   GEN: normal without any fever, night sweats or weight loss  HEENT: normal with no HA's, sore throat, stiff neck, changes in vision  CV: normal with no CP, SOB, PND, SCHUMACHER or orthopnea  PULM: normal with no SOB, cough, hemoptysis, sputum or pleuritic pain  GI: normal with no abdominal pain, nausea, vomiting, constipation, diarrhea, melanotic stools, BRBPR, or hematemesis  : normal with no hematuria, dysuria  BREAST: normal with no mass, discharge, pain  SKIN: normal with no rash, erythema, bruising, or swelling    Allergies:  Review of patient's allergies indicates:   Allergen Reactions    Ceftin [cefuroxime axetil]     Codeine     Levaquin [levofloxacin]     Prednisone     Remeron [mirtazapine]     Ultracet [tramadol-acetaminophen]        Medications:    Current Outpatient Prescriptions:     ALPRAZolam (XANAX) 0.25 MG tablet, Take 0.25 mg by mouth 3 (three) times daily., Disp: , Rfl:     aspirin (ECOTRIN) 81 MG EC tablet, Take 81 mg by mouth once daily., Disp: , Rfl:     atorvastatin calcium (LIPITOR ORAL), Take by mouth., Disp: ,  Rfl:     citalopram (CELEXA) 20 MG tablet, Take 20 mg by mouth once daily., Disp: , Rfl:     cloNIDine 0.2 mg/24 hr td ptwk (CATAPRES) 0.2 mg/24 hr, Place 1 patch onto the skin every 7 days., Disp: , Rfl:     epoetin sunitha (PROCRIT) 10,000 unit/mL injection, Inject into the skin., Disp: , Rfl:     esomeprazole (NEXIUM) 40 MG capsule, Take 40 mg by mouth before breakfast., Disp: , Rfl:     fenofibrate 160 MG Tab, Take 160 mg by mouth once daily., Disp: , Rfl:     furosemide (LASIX) 20 MG tablet, Take 20 mg by mouth 2 (two) times daily., Disp: , Rfl:     hydrALAZINE (APRESOLINE) 50 MG tablet, Take 50 mg by mouth 3 (three) times daily., Disp: , Rfl:     hydrocodone-acetaminophen (HYCET) solution 7.5-325 mg/15mL, Take 15 mLs by mouth every 4 to 6 hours as needed for Pain., Disp: 750 mL, Rfl: 0    hydrocodone-acetaminophen 7.5-325mg (NORCO) 7.5-325 mg per tablet, Take 1 tablet by mouth every 4 to 6 hours as needed for Pain., Disp: 60 tablet, Rfl: 0    insulin lispro (HUMALOG) 100 unit/mL injection, Inject into the skin 3 (three) times daily before meals., Disp: , Rfl:     lidocaine HCl 2% (XYLOCAINE) 2 % Soln, by Mucous Membrane route every 4 (four) hours., Disp: 100 mL, Rfl: 5    linagliptin (TRADJENTA) 5 mg Tab tablet, Take 5 mg by mouth once daily., Disp: , Rfl:     potassium chloride (MICRO-K) 10 MEQ CpSR, Take 10 mEq by mouth once., Disp: , Rfl:     spironolactone (ALDACTONE) 50 MG tablet, Take 50 mg by mouth once daily., Disp: , Rfl:     terazosin (HYTRIN) 5 MG capsule, Take 5 mg by mouth every evening., Disp: , Rfl:     travoprost, benzalkonium, (TRAVATAN) 0.004 % ophthalmic solution, 1 drop every evening., Disp: , Rfl:     PMHx/PSHx Updates:  See patient's last visit with me on 5/9/2018.  See H&P on 5/9/2018        Pathology:    Left Neck FNA: 4/10/2018  FINAL DIAGNOSIS:  LEFT CERVICAL (NECK) MASS, NEEDLE BIOPSY:    - MODERATELY DIFFERENTIATED SQUAMOUS CELL CARCINOMA.          Objective:      Vitals:  Blood pressure (!) 196/75, pulse 65, temperature 98.2 °F (36.8 °C), resp. rate 18, weight 81.7 kg (180 lb 3.2 oz).    Physical Examination:   GEN: no apparent distress, comfortable; AAOx3  HEAD: atraumatic and normocephalic  EYES: no pallor, no icterus, PERRLA  ENT: OMM, no pharyngeal erythema, external ears WNL; no nasal discharge; no thrush  NECK: no masses, thyroid normal, trachea midline, no LAD/LN's, supple  CV: RRR with no murmur; normal pulse; normal S1 and S2; no pedal edema  CHEST: Normal respiratory effort; CTAB; normal breath sounds; no wheeze or crackles  ABDOM: nontender and nondistended; soft; normal bowel sounds; no rebound/guarding  MUSC/Skeletal: ROM normal; no crepitus; joints normal; no deformities or arthropathy  EXTREM: no clubbing, cyanosis, inflammation or swelling  SKIN: no rashes, lesions, ulcers, petechiae or subcutaneous nodules  : no gregg  NEURO: grossly intact; motor/sensory WNL; AAOx3; no tremors  PSYCH: normal mood, affect and behavior  LYMPH: normal cervical, supraclavicular, axillary and groin LN's            Labs:     6/13/2018  Lab Results   Component Value Date    WBC 3.8 (L) 06/13/2018    HGB 10.4 (L) 06/13/2018    HCT 32.7 (L) 06/13/2018     06/13/2018     BMP  Lab Results   Component Value Date     (L) 06/13/2018    K 4.2 06/13/2018    CL 95 (L) 06/13/2018    CO2 26.7 06/13/2018    BUN 47 (H) 06/13/2018    CREATININE 1.57 (H) 06/13/2018    CALCIUM 8.7 06/13/2018     Lab Results   Component Value Date    AST 33 06/13/2018    ALKPHOS 29 (L) 06/13/2018    BILITOT 0.4 06/13/2018           Radiology/Diagnostic Studies:    No results found.    I have reviewed all available lab results and radiology reports.    Assessment/Plan:   (1) 87 y.o. male with diagnosis of newly diagnosed metastatic squamous cell esophageal CA  who has been referred by Dr Andrews Denis for evaluation by medical hematology/oncology. He originally presented with an enlarging neck  mass and was seen by Dr Mg and had an FNA which showed metastatic squamous cell carcinoma. He subsequently saw Dr Casillas at [facility] E[facility] and had laryngoscope which was essentially unremarkable. PET scan was done on 4/25/2018 with discovery of an upper esophageal primary mass which is suspected primary origin.       - CT scan was also performed which showed lymphadenopathy at level IB measuring 2.8 cm level II measuring 3.5 cm and level III measuring 2.5 cm. The PET scan also confirmed a soft tissue intraluminal esophageal mass at the C6-C7 level measuring 3.4 x 2.9 cm with an SUV of 9.9 consistent with a neoplastic process.     - he had EGD this past Friday with Dr Arreguin and had biopsy of the esophageal mass and peg tube placed     - He is of advanced age and has numerous co-morbidities including ESRD and CHF. I do not feel he would be a good candidate for chemotherapy or combined modality therapy. Immunologic therapy with Opdivo or Keytruda has not been approved for first line use in Esophageal Squamous Cell carcinomas, but will re-evaluate post monotherapy XRT.      - I reviewed the latest NCCN data version 1.2018    - he has been on monotherapy with XRT and is about 1/2 way through; he seems to be doing ok with the XRT and is still able to eat     (2) ESRD - no on HD - followed by Dr Winkler; he saw Dr Galicia yesterday     (3) DM     (4) CHF and CAD s/p CABG x4 in past - followed by Dr Pepper  - he saw Dr Pepper last week     (5) Chronic anemia - most likely multifactorial process with underlying anemia of chronic disorders and anemia of chronic renal disease at least  - he is on procrit  - hgb is currently 10.4  - required blood transfusion at end of May 2018     (6) Urologic/prostate issues - followed by Dr Toledo - ureter issues; elevated PSA          Squamous cell esophageal cancer          PLAN:  1. Monitor labs, continue procrit per Dr Galicia's direction; transfuse as needed  2. Continue with  with the XRT per direction of Dr Denis/Gilbert  3. F/u with PCP, Neph, Card, , etc  4. RTC in 3-4 weeks    Fax note to Keyshawn Denis Tveit, Morales, Neitzschman, Albright, Freidlander    Discussion:       I spent over 25 mins of time with the patient. Reviewed results of the recently ordered labs, tests and studies; made directives with regards to the results. Over half of this time was spent couseling and coordinating care.    I have explained all of the above in detail and the patient understands all of the current recommendation(s). I have answered all of their questions to the best of my ability and to their complete satisfaction.   The patient is to continue with the current management plan.            Electronically signed by Huey Nieto MD

## 2018-06-15 DIAGNOSIS — C15.9 SQUAMOUS CELL ESOPHAGEAL CANCER: Primary | ICD-10-CM

## 2018-06-15 RX ORDER — HYDROCODONE BITARTRATE AND ACETAMINOPHEN 7.5; 325 MG/1; MG/1
1 TABLET ORAL
Qty: 60 TABLET | Refills: 0 | Status: SHIPPED | OUTPATIENT
Start: 2018-06-15 | End: 2018-07-06 | Stop reason: SDUPTHER

## 2018-06-20 ENCOUNTER — TELEPHONE (OUTPATIENT)
Dept: RADIATION ONCOLOGY | Facility: CLINIC | Age: 83
End: 2018-06-20

## 2018-07-02 DIAGNOSIS — C15.9 SQUAMOUS CELL ESOPHAGEAL CANCER: Primary | ICD-10-CM

## 2018-07-02 RX ORDER — SILVER SULFADIAZINE 10 G/1000G
CREAM TOPICAL 2 TIMES DAILY
Qty: 400 G | Refills: 2 | Status: SHIPPED | OUTPATIENT
Start: 2018-07-02

## 2018-07-03 ENCOUNTER — OFFICE VISIT (OUTPATIENT)
Dept: HEMATOLOGY/ONCOLOGY | Facility: CLINIC | Age: 83
End: 2018-07-03
Payer: MEDICARE

## 2018-07-03 ENCOUNTER — DOCUMENTATION ONLY (OUTPATIENT)
Dept: RADIATION ONCOLOGY | Facility: CLINIC | Age: 83
End: 2018-07-03

## 2018-07-03 VITALS
RESPIRATION RATE: 18 BRPM | SYSTOLIC BLOOD PRESSURE: 132 MMHG | BODY MASS INDEX: 26.47 KG/M2 | WEIGHT: 174.13 LBS | HEART RATE: 70 BPM | DIASTOLIC BLOOD PRESSURE: 63 MMHG | TEMPERATURE: 98 F

## 2018-07-03 DIAGNOSIS — C15.9 SQUAMOUS CELL ESOPHAGEAL CANCER: Primary | ICD-10-CM

## 2018-07-03 PROCEDURE — 99214 OFFICE O/P EST MOD 30 MIN: CPT | Mod: ,,, | Performed by: INTERNAL MEDICINE

## 2018-07-03 NOTE — LETTER
July 3, 2018      Regan Mahajan MD  [address]  [facility] [address]           [facility] - Hematology Oncology  [address]  [address]  [facility] [address]  Phone: [phone]  Fax: [phone]          Patient: Con Stanton   MR Number: [id number]   YOB: 1930   Date of Visit: 7/3/2018       Dear Dr. Regan Mahajan:    Thank you for referring Con Stanton to me for evaluation. Attached you will find relevant portions of my assessment and plan of care.    If you have questions, please do not hesitate to call me. I look forward to following Con Stanton along with you.    Sincerely,    Huey Nieto MD    Enclosure  CC:  No Recipients    If you would like to receive this communication electronically, please contact [email or web address] or [phone] to request more information on [employer] Link access.    For providers and/or their staff who would like to refer a patient to [facility], please contact us through our one-stop-shop provider referral line, [facility], at [phone].    If you feel you have received this communication in error or would no longer like to receive these types of communications, please e-mail [email or web address]

## 2018-07-03 NOTE — PROGRESS NOTES
[facility] Hematology/Oncology  PROGRESS NOTE       Subjective:       Patient ID:   NAME: Con Stanton : 1930     87 y.o. male    Referring Doc: Cira  Other Physicians: Sonja ([facility]-ENT), Soto Mg (ENT); Regan Mahajan (PCP); Grayson; Evgeny; Kallie Toledo    Chief Complaint:  SCCA esophageal ca f/u    History of Present Illness:     Patient returns today for a 3rd regularly scheduled follow-up visit.  The patient is here today to go over the results of the recently ordered labs, tests and studies. He has been on monotherapy with XRT per direction of Dr Denis. He is here with his son and wife. He saw Dr Hunt yesterday. He is still swallowing but with some difficulty. Relying on tube feeds. He is breathing ok. He denies any CP, SOB, HA's or N/V. He is on cream for the skin of the neck. He sees Dr Casillas on .           ROS:   GEN: normal without any fever, night sweats or weight loss  HEENT: normal with no HA's, sore throat, stiff neck, changes in vision  CV: normal with no CP, SOB, PND, SCHUMACHER or orthopnea  PULM: normal with no SOB, cough, hemoptysis, sputum or pleuritic pain  GI: normal with no abdominal pain, nausea, vomiting, constipation, diarrhea, melanotic stools, BRBPR, or hematemesis  : normal with no hematuria, dysuria  BREAST: normal with no mass, discharge, pain  SKIN: normal with no rash, erythema, bruising, or swelling    Allergies:  Review of patient's allergies indicates:   Allergen Reactions    Ceftin [cefuroxime axetil]     Codeine     Levaquin [levofloxacin]     Prednisone     Remeron [mirtazapine]     Ultracet [tramadol-acetaminophen]        Medications:    Current Outpatient Prescriptions:     ALPRAZolam (XANAX) 0.25 MG tablet, Take 0.25 mg by mouth 3 (three) times daily., Disp: , Rfl:     aspirin (ECOTRIN) 81 MG EC tablet, Take 81 mg by mouth once daily., Disp: , Rfl:     atorvastatin calcium (LIPITOR ORAL), Take by mouth., Disp: , Rfl:      citalopram (CELEXA) 20 MG tablet, Take 20 mg by mouth once daily., Disp: , Rfl:     cloNIDine 0.2 mg/24 hr td ptwk (CATAPRES) 0.2 mg/24 hr, Place 1 patch onto the skin every 7 days., Disp: , Rfl:     epoetin sunitha (PROCRIT) 10,000 unit/mL injection, Inject into the skin., Disp: , Rfl:     esomeprazole (NEXIUM) 40 MG capsule, Take 40 mg by mouth before breakfast., Disp: , Rfl:     fenofibrate 160 MG Tab, Take 160 mg by mouth once daily., Disp: , Rfl:     furosemide (LASIX) 20 MG tablet, Take 20 mg by mouth 2 (two) times daily., Disp: , Rfl:     hydrALAZINE (APRESOLINE) 50 MG tablet, Take 50 mg by mouth 3 (three) times daily., Disp: , Rfl:     hydrocodone-acetaminophen (HYCET) solution 7.5-325 mg/15mL, Take 15 mLs by mouth every 4 to 6 hours as needed for Pain., Disp: 750 mL, Rfl: 0    HYDROcodone-acetaminophen (NORCO) 7.5-325 mg per tablet, Take 1 tablet by mouth every 4 to 6 hours as needed for Pain., Disp: 60 tablet, Rfl: 0    insulin lispro (HUMALOG) 100 unit/mL injection, Inject into the skin 3 (three) times daily before meals., Disp: , Rfl:     lidocaine HCl 2% (XYLOCAINE) 2 % Soln, by Mucous Membrane route every 4 (four) hours., Disp: 100 mL, Rfl: 5    linagliptin (TRADJENTA) 5 mg Tab tablet, Take 5 mg by mouth once daily., Disp: , Rfl:     potassium chloride (MICRO-K) 10 MEQ CpSR, Take 10 mEq by mouth once., Disp: , Rfl:     silver sulfADIAZINE 1% (SILVADENE) 1 % cream, Apply topically 2 (two) times daily., Disp: 400 g, Rfl: 2    spironolactone (ALDACTONE) 50 MG tablet, Take 50 mg by mouth once daily., Disp: , Rfl:     terazosin (HYTRIN) 5 MG capsule, Take 5 mg by mouth every evening., Disp: , Rfl:     travoprost, benzalkonium, (TRAVATAN) 0.004 % ophthalmic solution, 1 drop every evening., Disp: , Rfl:     PMHx/PSHx Updates:  See patient's last visit with me on 6/13/2018.  See H&P on 5/9/2018        Pathology:    Left Neck FNA: 4/10/2018  FINAL DIAGNOSIS:  LEFT CERVICAL (NECK) MASS, NEEDLE  BIOPSY:    - MODERATELY DIFFERENTIATED SQUAMOUS CELL CARCINOMA.          Objective:     Vitals:  Blood pressure 132/63, pulse 70, temperature 98 °F (36.7 °C), resp. rate 18, weight 79 kg (174 lb 1.6 oz).    Physical Examination:   GEN: no apparent distress, comfortable; AAOx3  HEAD: atraumatic and normocephalic  EYES: no pallor, no icterus, PERRLA  ENT: OMM, no pharyngeal erythema, external ears WNL; no nasal discharge; no thrush  NECK: no masses, thyroid normal, trachea midline, no LAD/LN's, supple  CV: RRR with no murmur; normal pulse; normal S1 and S2; no pedal edema  CHEST: Normal respiratory effort; CTAB; normal breath sounds; no wheeze or crackles  ABDOM: nontender and nondistended; soft; normal bowel sounds; no rebound/guarding  MUSC/Skeletal: ROM normal; no crepitus; joints normal; no deformities or arthropathy  EXTREM: no clubbing, cyanosis, inflammation or swelling  SKIN: no rashes, lesions, ulcers, petechiae or subcutaneous nodules; radiation burn on left neck   : no gregg  NEURO: grossly intact; motor/sensory WNL; AAOx3; no tremors  PSYCH: normal mood, affect and behavior  LYMPH: normal cervical, supraclavicular, axillary and groin LN's            Labs:     6/27/2018  Lab Results   Component Value Date    WBC 4.0 (L) 06/27/2018    HGB 10.7 (L) 06/27/2018    HCT 33.8 (L) 06/27/2018     06/27/2018     BMP  Lab Results   Component Value Date     (L) 06/27/2018    K 4.9 06/27/2018    CL 92 (L) 06/27/2018    CO2 29.6 06/27/2018    BUN 56 (H) 06/27/2018    CREATININE 1.87 (H) 06/27/2018    CALCIUM 8.8 06/27/2018     Lab Results   Component Value Date    AST 29 06/27/2018    ALKPHOS 34 (L) 06/27/2018    BILITOT 0.6 06/27/2018           Radiology/Diagnostic Studies:    No results found.    I have reviewed all available lab results and radiology reports.    Assessment/Plan:   (1) 87 y.o. male with diagnosis of newly diagnosed metastatic squamous cell esophageal CA  who has been referred by   Andrews Denis for evaluation by medical hematology/oncology. He originally presented with an enlarging neck mass and was seen by Dr Mg and had an FNA which showed metastatic squamous cell carcinoma. He subsequently saw Dr Casillas at [facility] ENT and had laryngoscope which was essentially unremarkable. PET scan was done on 4/25/2018 with discovery of an upper esophageal primary mass which is suspected primary origin.       - CT scan was also performed which showed lymphadenopathy at level IB measuring 2.8 cm level II measuring 3.5 cm and level III measuring 2.5 cm. The PET scan also confirmed a soft tissue intraluminal esophageal mass at the C6-C7 level measuring 3.4 x 2.9 cm with an SUV of 9.9 consistent with a neoplastic process.     - he had EGD this past Friday with Dr Arreguin and had biopsy of the esophageal mass and peg tube placed     - He is of advanced age and has numerous co-morbidities including ESRD and CHF. I do not feel he would be a good candidate for chemotherapy or combined modality therapy. Immunologic therapy with Opdivo or Keytruda has not been approved for first line use in Esophageal Squamous Cell carcinomas, but will re-evaluate post monotherapy XRT.      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 Monitor labs, continue procrit per Dr Galicia's direction; transfuse as needed  2. F/u with EVT on 7/24 and rad/onc in 3 weeks - he will need to wait 4-6 weeks before we can repeat PET  3. F/u with PCP, Neph, Card, , etc  4. Continue peg feeds and hydration  5. RTC in 3-4 weeks    Fax note to Keyshawn Denis Tveit, [place] Pepper Albright, Freidlander    Discussion:       I spent over 25 mins of time with the patient. Reviewed results of the recently ordered labs, tests and studies; made directives with regards to the results. Over half of this time was spent couseling and coordinating care.    I have explained all of the above in detail and the patient understands all of the current recommendation(s). I have answered all of their questions to the best of my ability and to their complete satisfaction.   The patient is to continue with the current management plan.            Electronically signed by Huey Nieto MD

## 2018-07-03 NOTE — PROGRESS NOTES
NUTRITION NOTE - LAST RADIATION TREATMENT    Today was Mr. Davis's last radiation treatment.      Weight: 174#, down 14# = 7.4% since start of treatment.  He is using his PEG getting 4-5 cans of Osmolite 1.5 daily.  He will try to drink 1 carton of Glucerna, but having difficulty swallowing due to mucositis.    He continues to see his home health speech therapist weekly.    He has not seen the lymphedema therapist.    Plan: Will send another request for tube feeding supplies to [facility] as I am running out of my supply of formula to share with him.    2. Educated son and wife on importance of nutrition for healing radiation burns.  Advised they continue to aim for 2200 - 2600 calories daily.  He will need 5 cartons of Osmolite 1.5  calories and 2 bottles of Glucerna to meet his nutrition needs of 2200 calories. (His wife will purchase Glucerna).  3. Advised that continue toward goal of 8 cups of fluid daily, which he gets with tube feeding and water/gatorade flushes.  4. Advised that as he is able to eat/drink more, that for every 350 calories he takes in, he can decrease tube feeding formula by a can. Advised wife to add cream to soups, milkshakes. Advised she refer to information I gave them early in treatment to add calories etc.  5. Gave daily food/tube feeding record sheets.  6. Advised that he weigh himself daily at home.  Advised that if he can maintain his weight without the need to use his feeding tube for 2 weeks, the doctor will consider removing the tube.  7. Will send referral for lymphedema therapy.

## 2018-07-06 DIAGNOSIS — C15.9 SQUAMOUS CELL ESOPHAGEAL CANCER: ICD-10-CM

## 2018-07-06 RX ORDER — HYDROCODONE BITARTRATE AND ACETAMINOPHEN 7.5; 325 MG/1; MG/1
1 TABLET ORAL
Qty: 60 TABLET | Refills: 0 | Status: SHIPPED | OUTPATIENT
Start: 2018-07-06 | End: 2018-07-26 | Stop reason: SDUPTHER

## 2018-07-10 ENCOUNTER — DOCUMENTATION ONLY (OUTPATIENT)
Dept: RADIATION ONCOLOGY | Facility: CLINIC | Age: 83
End: 2018-07-10

## 2018-07-10 NOTE — PROGRESS NOTES
NUTRITION NOTE    Mr. Dill finished his radiation last week.  Weight: 168#, down another 5#.  He is not able to tolerate anything by mouth and is 100% dependent on tube feeding as his sole source of nutrition.     Adjusted body weight (ABW) = 74 Kg.    Nutrition needs:  Calories: 2220 - 2590 (30 - 35 kcal/ ABW)  Protein: 60 - 70 grams ( .8 - 1.0 grams/kg. ABW)  Fluid: 18 cc ( 25cc/kg ABW).    Anticipated length of need for enteral nutrition is 90 days or greater.    Plan: Sent order to [facility] for 8 cartons of Diabetasource AC daily.  2. May need to change to a renal formula if kidney function worsens.  He is being followed by Dr. Galicia regularly (nephrologist).  3. Advised Mr. Stanton to continue with weekly speech therapy.

## 2018-07-12 DIAGNOSIS — C15.9 SQUAMOUS CELL ESOPHAGEAL CANCER: Primary | ICD-10-CM

## 2018-07-12 RX ORDER — HYDROCODONE BITARTRATE AND ACETAMINOPHEN 7.5; 325 MG/15ML; MG/15ML
15 SOLUTION ORAL
Qty: 750 ML | Refills: 0 | Status: SHIPPED | OUTPATIENT
Start: 2018-07-12

## 2018-07-12 RX ORDER — LIDOCAINE HYDROCHLORIDE 20 MG/ML
SOLUTION OROPHARYNGEAL EVERY 4 HOURS
Qty: 100 ML | Refills: 5 | Status: SHIPPED | OUTPATIENT
Start: 2018-07-12 | End: 2019-01-01 | Stop reason: SDUPTHER

## 2018-07-20 DIAGNOSIS — C15.9 SQUAMOUS CELL ESOPHAGEAL CANCER: ICD-10-CM

## 2018-07-25 RX ORDER — HYDROCODONE BITARTRATE AND ACETAMINOPHEN 7.5; 325 MG/1; MG/1
1 TABLET ORAL
Qty: 60 TABLET | Refills: 0 | Status: CANCELLED | OUTPATIENT
Start: 2018-07-25

## 2018-07-26 DIAGNOSIS — C15.9 SQUAMOUS CELL ESOPHAGEAL CANCER: ICD-10-CM

## 2018-07-26 RX ORDER — HYDROCODONE BITARTRATE AND ACETAMINOPHEN 7.5; 325 MG/1; MG/1
1 TABLET ORAL
Qty: 60 TABLET | Refills: 0 | Status: SHIPPED | OUTPATIENT
Start: 2018-07-26 | End: 2018-01-01 | Stop reason: SDUPTHER

## 2018-07-31 ENCOUNTER — OFFICE VISIT (OUTPATIENT)
Dept: HEMATOLOGY/ONCOLOGY | Facility: CLINIC | Age: 83
End: 2018-07-31
Payer: MEDICARE

## 2018-07-31 ENCOUNTER — OFFICE VISIT (OUTPATIENT)
Dept: RADIATION ONCOLOGY | Facility: CLINIC | Age: 83
End: 2018-07-31
Payer: MEDICARE

## 2018-07-31 VITALS
TEMPERATURE: 98 F | HEART RATE: 89 BPM | DIASTOLIC BLOOD PRESSURE: 63 MMHG | HEIGHT: 66 IN | BODY MASS INDEX: 26.86 KG/M2 | SYSTOLIC BLOOD PRESSURE: 113 MMHG | WEIGHT: 167.13 LBS

## 2018-07-31 VITALS — BODY MASS INDEX: 25.39 KG/M2 | WEIGHT: 167 LBS

## 2018-07-31 DIAGNOSIS — C15.9 SQUAMOUS CELL ESOPHAGEAL CANCER: Primary | ICD-10-CM

## 2018-07-31 DIAGNOSIS — C15.9 SQUAMOUS CELL ESOPHAGEAL CANCER: ICD-10-CM

## 2018-07-31 DIAGNOSIS — C77.0 SECONDARY MALIGNANT NEOPLASM OF CERVICAL LYMPH NODE: Primary | ICD-10-CM

## 2018-07-31 PROCEDURE — 99214 OFFICE O/P EST MOD 30 MIN: CPT | Mod: ,,, | Performed by: INTERNAL MEDICINE

## 2018-07-31 PROCEDURE — 99024 POSTOP FOLLOW-UP VISIT: CPT | Mod: ,,, | Performed by: RADIOLOGY

## 2018-07-31 NOTE — LETTER
July 31, 2018      Regan Mahajan MD  [address]  [facility] [address]           [facility] - Hematology Oncology  [address]  [address]  [place] [place] [address]  Phone: [phone]  Fax: [phone]          Patient: Con Stanton   MR Number: [id number]   YOB: 1930   Date of Visit: 7/31/2018       Dear Dr. Regan Mahajan:    Thank you for referring Con Stanton to me for evaluation. Attached you will find relevant portions of my assessment and plan of care.    If you have questions, please do not hesitate to call me. I look forward to following Con Stanton along with you.    Sincerely,    Huey Nieto MD    Enclosure  CC:  No Recipients    If you would like to receive this communication electronically, please contact [email or web address] or [phone] to request more information on [employer] access.    For providers and/or their staff who would like to refer a patient to [facility], please contact us through our one-stop-shop provider referral line, [facility], at [phone].    If you feel you have received this communication in error or would no longer like to receive these types of communications, please e-mail [email or web address]

## 2018-07-31 NOTE — PROGRESS NOTES
[facility] Hematology/Oncology  PROGRESS NOTE       Subjective:       Patient ID:   NAME: Con Stanton : 1930     87 y.o. male    Referring Doc: Cira  Other Physicians: Sonja ([facility]-ENT), [facility] (ENT); Regan Mahajan (PCP); Grayson; Evgeny; Kallie Toledo    Chief Complaint:  SCCA esophageal ca f/u    History of Present Illness:     Patient returns today for a 3rd regularly scheduled follow-up visit.  The patient is here today to go over the results of the recently ordered labs, tests and studies. He has completed monotherapy with XRT per direction of Dr Denis on 7/3. He is here with his two sons and wife. He saw Dr Hunt today.     He is swallowing better. Relying on tube feeds still. He is breathing ok. He denies any CP, SOB, HA's or N/V. neck jose are resolved. He sees Dr Casillas next Tuesday.    He is feeling ok. He recent bout of pneumonia 2 weeks ago but has since recovered.        ROS:   GEN: normal without any fever, night sweats or weight loss  HEENT: normal with no HA's, sore throat, stiff neck, changes in vision  CV: normal with no CP, SOB, PND, SCHUMACHER or orthopnea  PULM: normal with no SOB, cough, hemoptysis, sputum or pleuritic pain  GI: normal with no abdominal pain, nausea, vomiting, constipation, diarrhea, melanotic stools, BRBPR, or hematemesis  : normal with no hematuria, dysuria  BREAST: normal with no mass, discharge, pain  SKIN: normal with no rash, erythema, bruising, or swelling    Allergies:  Review of patient's allergies indicates:   Allergen Reactions    Ceftin [cefuroxime axetil]     Codeine     Levaquin [levofloxacin]     Prednisone     Remeron [mirtazapine]     Ultracet [tramadol-acetaminophen]        Medications:    Current Outpatient Prescriptions:     ALPRAZolam (XANAX) 0.25 MG tablet, Take 0.25 mg by mouth 3 (three) times daily., Disp: , Rfl:     aspirin (ECOTRIN) 81 MG EC tablet, Take 81 mg by mouth once daily., Disp: , Rfl:      atorvastatin calcium (LIPITOR ORAL), Take by mouth., Disp: , Rfl:     citalopram (CELEXA) 20 MG tablet, Take 20 mg by mouth once daily., Disp: , Rfl:     cloNIDine 0.2 mg/24 hr td ptwk (CATAPRES) 0.2 mg/24 hr, Place 1 patch onto the skin every 7 days., Disp: , Rfl:     epoetin sunitha (PROCRIT) 10,000 unit/mL injection, Inject into the skin., Disp: , Rfl:     esomeprazole (NEXIUM) 40 MG capsule, Take 40 mg by mouth before breakfast., Disp: , Rfl:     fenofibrate 160 MG Tab, Take 160 mg by mouth once daily., Disp: , Rfl:     furosemide (LASIX) 20 MG tablet, Take 20 mg by mouth 2 (two) times daily., Disp: , Rfl:     hydrALAZINE (APRESOLINE) 50 MG tablet, Take 50 mg by mouth 3 (three) times daily., Disp: , Rfl:     hydrocodone-acetaminophen (HYCET) solution 7.5-325 mg/15mL, Take 15 mLs by mouth every 4 to 6 hours as needed for Pain., Disp: 750 mL, Rfl: 0    HYDROcodone-acetaminophen (NORCO) 7.5-325 mg per tablet, Take 1 tablet by mouth every 4 to 6 hours as needed for Pain., Disp: 60 tablet, Rfl: 0    insulin lispro (HUMALOG) 100 unit/mL injection, Inject into the skin 3 (three) times daily before meals., Disp: , Rfl:     lidocaine HCl 2% (XYLOCAINE) 2 % Soln, by Mucous Membrane route every 4 (four) hours., Disp: 100 mL, Rfl: 5    linagliptin (TRADJENTA) 5 mg Tab tablet, Take 5 mg by mouth once daily., Disp: , Rfl:     potassium chloride (MICRO-K) 10 MEQ CpSR, Take 10 mEq by mouth once., Disp: , Rfl:     silver sulfADIAZINE 1% (SILVADENE) 1 % cream, Apply topically 2 (two) times daily., Disp: 400 g, Rfl: 2    spironolactone (ALDACTONE) 50 MG tablet, Take 50 mg by mouth once daily., Disp: , Rfl:     terazosin (HYTRIN) 5 MG capsule, Take 5 mg by mouth every evening., Disp: , Rfl:     travoprost, benzalkonium, (TRAVATAN) 0.004 % ophthalmic solution, 1 drop every evening., Disp: , Rfl:     PMHx/PSHx Updates:  See patient's last visit with me on 6/13/2018.  See H&P on 5/9/2018        Pathology:    Left Neck  "FNA: 4/10/2018  FINAL DIAGNOSIS:  LEFT CERVICAL (NECK) MASS, NEEDLE BIOPSY:    - MODERATELY DIFFERENTIATED SQUAMOUS CELL CARCINOMA.          Objective:     Vitals:  Blood pressure 113/63, pulse 89, temperature 97.9 °F (36.6 °C), height 5' 6" (1.676 m), weight 75.8 kg (167 lb 1.6 oz).    Physical Examination:   GEN: no apparent distress, comfortable; AAOx3  HEAD: atraumatic and normocephalic  EYES: no pallor, no icterus, PERRLA  ENT: OMM, no pharyngeal erythema, external ears WNL; no nasal discharge; no thrush  NECK: no masses, thyroid normal, trachea midline, no LAD/LN's, supple  CV: RRR with no murmur; normal pulse; normal S1 and S2; no pedal edema  CHEST: Normal respiratory effort; CTAB; normal breath sounds; no wheeze or crackles  ABDOM: nontender and nondistended; soft; normal bowel sounds; no rebound/guarding  MUSC/Skeletal: ROM normal; no crepitus; joints normal; no deformities or arthropathy  EXTREM: no clubbing, cyanosis, inflammation or swelling  SKIN: no rashes, lesions, ulcers, petechiae or subcutaneous nodules; radiation burn on left neck resolved  : no gregg  NEURO: grossly intact; motor/sensory WNL; AAOx3; no tremors  PSYCH: normal mood, affect and behavior  LYMPH: normal cervical, supraclavicular, axillary and groin LN's            Labs:     7/25/2018  Lab Results   Component Value Date    WBC 4.6 (L) 07/25/2018    HGB 10.7 (L) 07/25/2018    HCT 32.9 (L) 07/25/2018     07/25/2018     BMP  Lab Results   Component Value Date     (L) 07/25/2018    K 4.5 07/25/2018    CL 93 (L) 07/25/2018    CO2 27.1 07/25/2018    BUN 80 (H) 07/25/2018    CREATININE 1.71 (H) 07/25/2018    CALCIUM 9.2 07/25/2018     Lab Results   Component Value Date    AST 42 (H) 07/25/2018    ALKPHOS 50 07/25/2018    BILITOT 0.6 07/25/2018           Radiology/Diagnostic Studies:    No results found.    I have reviewed all available lab results and radiology reports.    Assessment/Plan:   (1) 87 y.o. male with diagnosis of " newly diagnosed metastatic squamous cell esophageal CA  who has been referred by Dr Andrews Denis for evaluation by medical hematology/oncology. He originally presented with an enlarging neck mass and was seen by Dr Mg and had an FNA which showed metastatic squamous cell carcinoma. He subsequently saw Dr Casillas at [facility] and had laryngoscope which was essentially unremarkable. PET scan was done on 4/25/2018 with discovery of an upper esophageal primary mass which is suspected primary origin.       - CT scan was also performed which showed lymphadenopathy at level IB measuring 2.8 cm level II measuring 3.5 cm and level III measuring 2.5 cm. The PET scan also confirmed a soft tissue intraluminal esophageal mass at the C6-C7 level measuring 3.4 x 2.9 cm with an SUV of 9.9 consistent with a neoplastic process.     - he had EGD with Dr Arreguin and had biopsy of the esophageal mass and peg tube placed     - He is of advanced age and has numerous co-morbidities including ESRD and CHF. I do not feel he would be a good candidate for chemotherapy or combined modality therapy. Immunologic therapy with Opdivo or Keytruda has not been approved for first line use in Esophageal Squamous Cell carcinomas, but will re-evaluate post monotherapy XRT.      - I reviewed the latest NCCN data version 1.2018    - he completed monotherapy with XRT and finished up on 7/3; he seems to have done ok with the XRT and is swallowing with some difficulty and relying on tube feeds       (2) ESRD - no on HD - followed by Dr Winkler; he saw Dr Galicia yesterday     (3) DM     (4) CHF and CAD s/p CABG x4 in past - followed by Dr Pepper  - he saw Dr Pepper last week     (5) Chronic anemia - most likely multifactorial process with underlying anemia of chronic disorders and anemia of chronic renal disease at least  - he is on procrit  - hgb is currently 10.7 and adequate currently  - required blood transfusion at end of May 2018     (6)  Urologic/prostate issues - followed by Dr Toledo - ureter issues; elevated PSA          Squamous cell esophageal cancer          PLAN:  1. Monitor labs, continue procrit per Dr Galicia's direction; transfuse as needed  2. F/u with ENT with Dr Foy in near future  3. Will probably need EGD  4. Will need repeat laryngoscope too as well as PET  5. F/u with PCP, Neph, Card, , etc  6. Continue peg feeds and hydration  7. RTC in 3-4 weeks    Fax note to Keyshawn Denis Tveit, [place] Pepper, Sonja Arreguin    Discussion:       I spent over 25 mins of time with the patient. Reviewed results of the recently ordered labs, tests and studies; made directives with regards to the results. Over half of this time was spent couseling and coordinating care.    I have explained all of the above in detail and the patient understands all of the current recommendation(s). I have answered all of their questions to the best of my ability and to their complete satisfaction.   The patient is to continue with the current management plan.            Electronically signed by Huey Nieto MD

## 2018-07-31 NOTE — PROGRESS NOTES
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 Mg for sinus problems and a left-sided neck mass was discovered. He underwent a fine-needle aspirate of the neck mass making a diagnosis squamous cell carcinoma.  He presented to Dr. Foy, for workup which included a PET scan dated 04/25/2018. This study revealed multiple lymph nodes in the neck specifically to large hypermetabolic level II nodes consistent with neoplastic disease a CT scan was also performed which showed lymphadenopathy at level IB measuring 2.8 cm level II measuring 3.5 cm and level III measuring 2.5 cm. The PET scan also confirmed a soft tissue intraluminal esophageal mass at the C6-C7 level measuring 3.4 x 2.9 cm with an SUV of 9.9 consistent with a neoplastic process.  There is no evidence of distant metastatic disease.    Dr. 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    Patient was treated with definitive radiotherapy alone, 70 gray to neck disease, 60 gray to gross disease at esophagus, 56 gray B elective necks ending 7/18.  Patient was treated with a PEG tube in placed by the end of therapy this is his main source of nutrition. He did have significant radiation dermatitis as well as mucositis extending through the oropharynx. He had significant fatigue but tolerated therapy very well without treatment breaks. He was very determined and had excellent support.    INTERVAL HISTORY:   Since completion of therapy patient reports he did have a brief setback when he was hospitalized for atypical pneumonia and placed on doxycycline. He has completed his  outpatient antibiotics. He reports his energy is slowly returning. He continues to have some difficulty swallowing and he has become by mouth tolerant to liquids including water and Gatorade. He reports xerostomia, dysgeusia but denies otalgia or trismus. He denies fever, chills, chest pain, shortness of breath, cough or hemoptysis. He continues to receive all of his nutrition via PEG tube. He does have a follow-up appointment with Dr. Foy in the coming weeks. His also follow with Dr. Nieto and sees him again today. The previously discussed immunotherapy.    Review of Systems   Constitutional: Positive for appetite change and fatigue. Negative for chills and fever.   HENT:   Positive for lump/mass, mouth sores, sore throat, trouble swallowing and voice change.    Eyes: Negative for eye problems and icterus.   Respiratory: Negative for chest tightness, cough, hemoptysis and shortness of breath.    Cardiovascular: Negative for chest pain and leg swelling.   Gastrointestinal: Negative for abdominal distention, abdominal pain, blood in stool, constipation, diarrhea, nausea and vomiting.   Genitourinary: Negative for bladder incontinence, difficulty urinating, dysuria, frequency, hematuria and nocturia.    Musculoskeletal: Positive for neck stiffness. Negative for back pain, gait problem and neck pain.   Skin: Positive for itching.   Neurological: Negative for extremity weakness, gait problem, headaches, numbness and seizures.   Hematological: Positive for adenopathy.     Past Medical History:   Diagnosis Date    Anemia     Arthritis     Cancer     neck    CHF (congestive heart failure)     Diabetes mellitus     Disorder of kidney and ureter      Past Surgical History:   Procedure Laterality Date    APPENDECTOMY      BOWEL RESECTION      CORONARY ANGIOPLASTY WITH STENT PLACEMENT      INGUINAL HERNIA REPAIR      NH CABG, ARTERY-VEIN, FOUR      Coronary Artery Bypass, 4    PROSTATE SURGERY        Social History     Social History    Marital status:      Spouse name: N/A    Number of children: N/A    Years of education: N/A     Social History Main Topics    Smoking status: Never Smoker    Smokeless tobacco: Never Used    Alcohol use No    Drug use: Unknown    Sexual activity: Not on file     Other Topics Concern    Not on file     Social History Narrative    No narrative on file     No family history on file.  Medication List with Changes/Refills   Current Medications    ALPRAZOLAM (XANAX) 0.25 MG TABLET    Take 0.25 mg by mouth 3 (three) times daily.    ASPIRIN (ECOTRIN) 81 MG EC TABLET    Take 81 mg by mouth once daily.    ATORVASTATIN CALCIUM (LIPITOR ORAL)    Take by mouth.    CITALOPRAM (CELEXA) 20 MG TABLET    Take 20 mg by mouth once daily.    CLONIDINE 0.2 MG/24 HR TD PTWK (CATAPRES) 0.2 MG/24 HR    Place 1 patch onto the skin every 7 days.    EPOETIN JOE (PROCRIT) 10,000 UNIT/ML INJECTION    Inject into the skin.    ESOMEPRAZOLE (NEXIUM) 40 MG CAPSULE    Take 40 mg by mouth before breakfast.    FENOFIBRATE 160 MG TAB    Take 160 mg by mouth once daily.    FUROSEMIDE (LASIX) 20 MG TABLET    Take 20 mg by mouth 2 (two) times daily.    HYDRALAZINE (APRESOLINE) 50 MG TABLET    Take 50 mg by mouth 3 (three) times daily.    HYDROCODONE-ACETAMINOPHEN (HYCET) SOLUTION 7.5-325 MG/15ML    Take 15 mLs by mouth every 4 to 6 hours as needed for Pain.    HYDROCODONE-ACETAMINOPHEN (NORCO) 7.5-325 MG PER TABLET    Take 1 tablet by mouth every 4 to 6 hours as needed for Pain.    INSULIN LISPRO (HUMALOG) 100 UNIT/ML INJECTION    Inject into the skin 3 (three) times daily before meals.    LIDOCAINE HCL 2% (XYLOCAINE) 2 % SOLN    by Mucous Membrane route every 4 (four) hours.    LINAGLIPTIN (TRADJENTA) 5 MG TAB TABLET    Take 5 mg by mouth once daily.    POTASSIUM CHLORIDE (MICRO-K) 10 MEQ CPSR    Take 10 mEq by mouth once.    SILVER SULFADIAZINE 1% (SILVADENE) 1 % CREAM    Apply topically 2 (two)  times daily.    SPIRONOLACTONE (ALDACTONE) 50 MG TABLET    Take 50 mg by mouth once daily.    TERAZOSIN (HYTRIN) 5 MG CAPSULE    Take 5 mg by mouth every evening.    TRAVOPROST, BENZALKONIUM, (TRAVATAN) 0.004 % OPHTHALMIC SOLUTION    1 drop every evening.     Review of patient's allergies indicates:   Allergen Reactions    Ceftin [cefuroxime axetil]     Codeine     Levaquin [levofloxacin]     Prednisone     Remeron [mirtazapine]     Ultracet [tramadol-acetaminophen]        QUALITY OF LIFE: 80%- Normal Activity with Effort: Some Symptoms of Disease    Vitals:    07/31/18 1310   Weight: 75.8 kg (167 lb)       PHYSICAL EXAM:   GENERAL: alert; in no apparent distress.   HEAD: normocephalic, atraumatic.  EYES: pupils are equal, round, reactive to light and accommodation. Sclera anicteric. Conjunctiva not injected.   NOSE/THROAT: no nasal erythema or rhinorrhea. Minimal non-confluent mucositis of the oropharynx, no bloody discharge or sloughing, good mouth health.  NECK: no cervical motion rigidity; left mid neck firmness proximal 1-2 cm in size, reduced from pretreatment  CHEST: clear to auscultation bilaterally; no wheezes, crackles or rubs. Patient is speaking comfortably on room air with normal work of breathing without using accessory muscles of respiration. No signs of pneumonia or aspiration  CARDIOVASCULAR: regular rate and rhythm  ABDOMEN: soft, nontender, nondistended. Bowel sounds present.   MUSCULOSKELETAL: no tenderness to palpation along the spine or scapulae. Normal range of motion.  NEUROLOGIC: cranial nerves II-XII intact bilaterally. Strength 5/5 in bilateral upper and lower extremities. No sensory deficits appreciated. Normal gait.  LYMPHATIC: Left midjugular chain lymphadenopathy, single lymph node, 1-2 cm firm, mild tenderness, significantly reduced from prior  EXTREMITIES: no clubbing, cyanosis, edema.  SKIN: Reepithelialization of left neck with no signs of active desquamation, sialitis,  fluctuance or infection; skin tenting    ANCILLARY DATA: none    ASSESSMENT: 87M with stage IIIB, cT2(?)N2M0 SCCa prox esophagus (no U/S stage) s/p definitive RT alone ending 7/18.  PLAN:  Mr. Stanton was very vigilant throughout his radiotherapy and did maintain his weight relatively well. He worked throughout with our dietitian and followed all of our support instructions. He maintained good mouth health with baking soda salt rinses  which I recommended he continue for 2 weeks. He continues to be PEG dependent however he has become by mouth tolerant of liquids. No signs or symptoms of aspiration on today's exam. We will send him for speech and swallow evaluation to clear him for by mouth intake. He is also going to see Dr. Foy with ENT next week and likely receive DFL at that time. He likely require GI evaluation also but will defer that decision to Dr. Foy, given location of tumor at cricoid. He's had a nice clinical response at the left neck and would like to further evaluate this would PET/CT scan and will ask for that at approximately 10 weeks from completion of therapy or 7 weeks from now. He will discuss consideration of immunotherapy with Dr. Nieto. I recommended he focus on hydration with Pedialyte or Gatorade given his clinical dehydration at today's visit. I also assured him that it'll take some time for his energy to return. He and his family expressed understanding. I've asked him to return to clinic in 3 months.    All questions answered and contact information provided. Patient understands free to call us anytime with any questions or concerns regarding radiation therapy.    I have personally seen and evaluated this patient. Greater than 50% of this time was spent discussing coordination of care and/or counseling.    PHYSICIAN: Juan Hunt Jr, MD

## 2018-08-02 ENCOUNTER — DOCUMENTATION ONLY (OUTPATIENT)
Dept: RADIATION ONCOLOGY | Facility: CLINIC | Age: 83
End: 2018-08-02

## 2018-08-02 NOTE — PROGRESS NOTES
NUTRITION NOTE - 3 WEEK POST XRT FOLLOW-UP    Weight: 167#  Weight down 2# since he was in the hospital days after finishing treatment.  He states he has actually gained weight, this past week, states his weight got down to 165#.  His wife states he is getting 8 cartons of Diabetasource and is starting to drink some liquids.  He states his taste buds are coming back. GFR:38 , Creatinine: 1.7  Blood sugar: 200 seem to be improved since treatment.    Plan: Advised wife and his 2 sons and Mr. Agosto that for every 300 calories he ate or drank by mouth, that he could decrease a can of his tube feeding.  2. Called Qian Crouch, his speech therapist to inform her that Dr. Hunt would like him to get a swallow study and that I will send orders to her when I get her fax number.

## 2018-08-08 NOTE — PROGRESS NOTES
Modified Barium Swallow Study scheduled for August 28th.    Problem: Mr. Dill and his wife are wanting the swallowing study done sooner.    Plan: Left message for Tisha Diehl in outpatiet rehab to see if the swallow study could be scheduled any sooner.

## 2018-08-28 NOTE — PROGRESS NOTES
[facility] Hematology/Oncology  PROGRESS NOTE       Subjective:       Patient ID:   NAME: Con Stanton : 1930     88 y.o. male    Referring Doc: Cira  Other Physicians: Sonja ([facility]-ENT), Soto Mg (ENT); Regan Mahajan (PCP); Grayson; Evgeny; Kallie Toledo    Chief Complaint:  SCCA esophageal ca f/u    History of Present Illness:     Patient returns today for a regularly scheduled follow-up visit.  The patient is here today to go over the results of the recently ordered labs, tests and studies. He has completed monotherapy with XRT per direction of Dr Denis on 7/3. He is here with his son and wife.      He is swallowing better and passed his swallow test; maintaining his weight. Relying on tube feeds still but may be able to get tube out in couple of weeks (i discussed with Anita with Nutrition).     He is breathing ok. He denies any CP, SOB, HA's or N/V. neck jose are resolved. He saw Dr Hunt on 2018.     He has a PET scan ordered for Oct 10th      ROS:   GEN: normal without any fever, night sweats or weight loss  HEENT: normal with no HA's, sore throat, stiff neck, changes in vision  CV: normal with no CP, SOB, PND, SCHUMACHER or orthopnea  PULM: normal with no SOB, cough, hemoptysis, sputum or pleuritic pain  GI: normal with no abdominal pain, nausea, vomiting, constipation, diarrhea, melanotic stools, BRBPR, or hematemesis  : normal with no hematuria, dysuria  BREAST: normal with no mass, discharge, pain  SKIN: normal with no rash, erythema, bruising, or swelling    Allergies:  Review of patient's allergies indicates:   Allergen Reactions    Ceftin [cefuroxime axetil]     Codeine     Levaquin [levofloxacin]     Prednisone     Remeron [mirtazapine]     Ultracet [tramadol-acetaminophen]        Medications:    Current Outpatient Medications:     ALPRAZolam (XANAX) 0.25 MG tablet, Take 0.25 mg by mouth 3 (three) times daily., Disp: , Rfl:     aspirin (ECOTRIN) 81 MG EC tablet,  Take 81 mg by mouth once daily., Disp: , Rfl:     atorvastatin calcium (LIPITOR ORAL), Take by mouth., Disp: , Rfl:     citalopram (CELEXA) 20 MG tablet, Take 20 mg by mouth once daily., Disp: , Rfl:     cloNIDine 0.2 mg/24 hr td ptwk (CATAPRES) 0.2 mg/24 hr, Place 1 patch onto the skin every 7 days., Disp: , Rfl:     epoetin sunitha (PROCRIT) 10,000 unit/mL injection, Inject into the skin., Disp: , Rfl:     esomeprazole (NEXIUM) 40 MG capsule, Take 40 mg by mouth before breakfast., Disp: , Rfl:     fenofibrate 160 MG Tab, Take 160 mg by mouth once daily., Disp: , Rfl:     furosemide (LASIX) 20 MG tablet, Take 20 mg by mouth 2 (two) times daily., Disp: , Rfl:     hydrALAZINE (APRESOLINE) 50 MG tablet, Take 50 mg by mouth 3 (three) times daily., Disp: , Rfl:     hydrocodone-acetaminophen (HYCET) solution 7.5-325 mg/15mL, Take 15 mLs by mouth every 4 to 6 hours as needed for Pain., Disp: 750 mL, Rfl: 0    HYDROcodone-acetaminophen (NORCO) 7.5-325 mg per tablet, Take 1 tablet by mouth every 4 to 6 hours as needed for Pain., Disp: 60 tablet, Rfl: 0    insulin lispro (HUMALOG) 100 unit/mL injection, Inject into the skin 3 (three) times daily before meals., Disp: , Rfl:     lidocaine HCl 2% (XYLOCAINE) 2 % Soln, by Mucous Membrane route every 4 (four) hours., Disp: 100 mL, Rfl: 5    linagliptin (TRADJENTA) 5 mg Tab tablet, Take 5 mg by mouth once daily., Disp: , Rfl:     potassium chloride (MICRO-K) 10 MEQ CpSR, Take 10 mEq by mouth once., Disp: , Rfl:     silver sulfADIAZINE 1% (SILVADENE) 1 % cream, Apply topically 2 (two) times daily., Disp: 400 g, Rfl: 2    spironolactone (ALDACTONE) 50 MG tablet, Take 50 mg by mouth once daily., Disp: , Rfl:     terazosin (HYTRIN) 5 MG capsule, Take 5 mg by mouth every evening., Disp: , Rfl:     travoprost, benzalkonium, (TRAVATAN) 0.004 % ophthalmic solution, 1 drop every evening., Disp: , Rfl:     PMHx/PSHx Updates:  See patient's last visit with me on  7/31/2018.  See H&P on 5/9/2018        Pathology:    Left Neck FNA: 4/10/2018  FINAL DIAGNOSIS:  LEFT CERVICAL (NECK) MASS, NEEDLE BIOPSY:    - MODERATELY DIFFERENTIATED SQUAMOUS CELL CARCINOMA.          Objective:     Vitals:  Blood pressure (!) 165/65, pulse 61, temperature 97.6 °F (36.4 °C), resp. rate 18, weight 78.6 kg (173 lb 3.2 oz).    Physical Examination:   GEN: no apparent distress, comfortable; AAOx3  HEAD: atraumatic and normocephalic  EYES: no pallor, no icterus, PERRLA  ENT: OMM, no pharyngeal erythema, external ears WNL; no nasal discharge; no thrush  NECK: no masses, thyroid normal, trachea midline, no LAD/LN's, supple  CV: RRR with no murmur; normal pulse; normal S1 and S2; no pedal edema  CHEST: Normal respiratory effort; CTAB; normal breath sounds; no wheeze or crackles  ABDOM: nontender and nondistended; soft; normal bowel sounds; no rebound/guarding  MUSC/Skeletal: ROM normal; no crepitus; joints normal; no deformities or arthropathy  EXTREM: no clubbing, cyanosis, inflammation or swelling  SKIN: no rashes, lesions, ulcers, petechiae or subcutaneous nodules; radiation burn on left neck resolved  : no gregg  NEURO: grossly intact; motor/sensory WNL; AAOx3; no tremors  PSYCH: normal mood, affect and behavior  LYMPH: normal cervical, supraclavicular, axillary and groin LN's            Labs:     8/17/2018:    Lab Results   Component Value Date    WBC 3.2 (L) 08/22/2018    HGB 9.2 (L) 08/22/2018    HCT 29.3 (L) 08/22/2018     08/22/2018     BMP  Lab Results   Component Value Date     (L) 08/22/2018    K 4.5 08/22/2018    CL 98 08/22/2018    CO2 27.8 08/22/2018    BUN 52 (H) 08/22/2018    CREATININE 1.41 (H) 08/22/2018    CALCIUM 9.4 08/22/2018     Lab Results   Component Value Date    AST 37 08/22/2018    ALKPHOS 44 08/22/2018    BILITOT 0.6 08/22/2018           Radiology/Diagnostic Studies:    No results found.    I have reviewed all available lab results and radiology  reports.    Assessment/Plan:   (1) 88 y.o. male with diagnosis of newly diagnosed metastatic squamous cell esophageal CA  who has been referred by Dr Andrews Denis for evaluation by medical hematology/oncology. He originally presented with an enlarging neck mass and was seen by Dr Mg and had an FNA which showed metastatic squamous cell carcinoma. He subsequently saw Dr Casillas at [facility] ENT and had laryngoscope which was essentially unremarkable. PET scan was done on 4/25/2018 with discovery of an upper esophageal primary mass which is suspected primary origin.       - CT scan was also performed which showed lymphadenopathy at level IB measuring 2.8 cm level II measuring 3.5 cm and level III measuring 2.5 cm. The PET scan also confirmed a soft tissue intraluminal esophageal mass at the C6-C7 level measuring 3.4 x 2.9 cm with an SUV of 9.9 consistent with a neoplastic process.     - he had EGD with Dr Arreguin and had biopsy of the esophageal mass and peg tube placed     - He is of advanced age and has numerous co-morbidities including ESRD and CHF. I do not feel he would be a good candidate for chemotherapy or combined modality therapy. Immunologic therapy with Opdivo or Keytruda has not been approved for first line use in Esophageal Squamous Cell carcinomas, but will re-evaluate post monotherapy XRT.      - I previously reviewed the latest NCCN data version 1.2018    - he completed monotherapy with XRT and finished up on 7/3/2018; he seems to have done ok with the XRT and was swallowing with some difficulty and relying on tube feeds  - I discussed with Anita with nutrition and he passed his swallow test and is doing better overall and maintaining his weight, with expectation that he may be able to have his tube removed in a couple of weeks  - a f/u PET scan has been ordered by Dr Hunt       (2) ESRD - no on HD - followed by Dr Winkler; he saw Dr Galicia yesterday     (3) DM     (4) CHF and CAD s/p CABG x4 in  past - followed by Dr Pepper       (5) Chronic anemia - most likely multifactorial process with underlying anemia of chronic disorders and anemia of chronic renal disease at least  - he is on procrit  - hgb is currently 9.2  - required blood transfusion at end of May 2018     (6) Urologic/prostate issues - followed by Dr Toledo - ureter issues; elevated PSA          Squamous cell esophageal cancer          PLAN:  1. Monitor labs, continue procrit per Dr Galicia's direction; transfuse as needed  2. F/u with ENT with Dr Foy in near future  3. Will probably need EGD and repeat laryngoscope   4. He is scheduled for PET on Oct 10th  5. Discussed consideration for immunologic therapy with Keytruda every three weeks once he fully recovers  6. F/u with PCP, Neph, Card, , etc  7. RTC in 3-4 weeks    Fax note to Keyshawn Denis Tveit, Evgeny, [place], Sonja Arreguin    Discussion:       I spent over 25 mins of time with the patient. Reviewed results of the recently ordered labs, tests and studies; made directives with regards to the results. Over half of this time was spent couseling and coordinating care.    I have explained all of the above in detail and the patient understands all of the current recommendation(s). I have answered all of their questions to the best of my ability and to their complete satisfaction.   The patient is to continue with the current management plan.            Electronically signed by Huey Nieto MD

## 2018-08-28 NOTE — LETTER
August 28, 2018      Regan Mahajan MD  [address] [facility] [address]  [facility] [address]           [facility] - Hematology Oncology  [address]  [address]  [facility] [address]  Phone: [phone]  Fax: [phone]          Patient: Con Stanton   MR Number: [id number]   YOB: 1930   Date of Visit: 8/28/2018       Dear Dr. Regan Mahajan:    Thank you for referring Con Stanton to me for evaluation. Attached you will find relevant portions of my assessment and plan of care.    If you have questions, please do not hesitate to call me. I look forward to following Con Stanton along with you.    Sincerely,    Huey Nieto MD    Enclosure  CC:  No Recipients    If you would like to receive this communication electronically, please contact [email or web address] or [phone] to request more information on [email or web address] Link access.    For providers and/or their staff who would like to refer a patient to [facility], please contact us through our one-stop-shop provider referral line, [facility], at [phone].    If you feel you have received this communication in error or would no longer like to receive these types of communications, please e-mail [email or web address]

## 2018-09-27 NOTE — PROGRESS NOTES
[facility] Hematology/Oncology  PROGRESS NOTE       Subjective:       Patient ID:   NAME: Con Stanton : 1930     88 y.o. male    Referring Doc: Cira  Other Physicians: Sonja ([facility]-ENT), Soto Mg (ENT); Regan Mahajan (PCP); Grayson; Evgeny; Kallie Toledo    Chief Complaint:  SCCA esophageal ca f/u    History of Present Illness:     Patient returns today for a regularly scheduled follow-up visit.  The patient is here today to go over the results of the recently ordered labs, tests and studies. He has completed monotherapy with XRT per direction of Dr Denis on 7/3. He is here with his wife. He is getting IV iron today.      He is swallowing better and is gaining weight.    He is breathing ok. He denies any CP, SOB, HA's or N/V. neck burns are resolved. He saw Dr Hunt on 2018.     He has a PET scan ordered for Oct 10th      ROS:   GEN: normal without any fever, night sweats or weight loss  HEENT: normal with no HA's, sore throat, stiff neck, changes in vision  CV: normal with no CP, SOB, PND, SCHUMACHER or orthopnea  PULM: normal with no SOB, cough, hemoptysis, sputum or pleuritic pain  GI: normal with no abdominal pain, nausea, vomiting, constipation, diarrhea, melanotic stools, BRBPR, or hematemesis  : normal with no hematuria, dysuria  BREAST: normal with no mass, discharge, pain  SKIN: normal with no rash, erythema, bruising, or swelling    Allergies:  Review of patient's allergies indicates:   Allergen Reactions    Ceftin [cefuroxime axetil]     Codeine     Levaquin [levofloxacin]     Prednisone     Remeron [mirtazapine]     Ultracet [tramadol-acetaminophen]        Medications:    Current Outpatient Medications:     ALPRAZolam (XANAX) 0.25 MG tablet, Take 0.25 mg by mouth 3 (three) times daily., Disp: , Rfl:     aspirin (ECOTRIN) 81 MG EC tablet, Take 81 mg by mouth once daily., Disp: , Rfl:     atorvastatin calcium (LIPITOR ORAL), Take by mouth., Disp: , Rfl:      citalopram (CELEXA) 20 MG tablet, Take 20 mg by mouth once daily., Disp: , Rfl:     cloNIDine 0.2 mg/24 hr td ptwk (CATAPRES) 0.2 mg/24 hr, Place 1 patch onto the skin every 7 days., Disp: , Rfl:     epoetin sunitha (PROCRIT) 10,000 unit/mL injection, Inject into the skin., Disp: , Rfl:     esomeprazole (NEXIUM) 40 MG capsule, Take 40 mg by mouth before breakfast., Disp: , Rfl:     fenofibrate 160 MG Tab, Take 160 mg by mouth once daily., Disp: , Rfl:     furosemide (LASIX) 20 MG tablet, Take 20 mg by mouth 2 (two) times daily., Disp: , Rfl:     hydrALAZINE (APRESOLINE) 50 MG tablet, Take 50 mg by mouth 3 (three) times daily., Disp: , Rfl:     hydrocodone-acetaminophen (HYCET) solution 7.5-325 mg/15mL, Take 15 mLs by mouth every 4 to 6 hours as needed for Pain., Disp: 750 mL, Rfl: 0    HYDROcodone-acetaminophen (NORCO) 7.5-325 mg per tablet, Take 1 tablet by mouth every 4 to 6 hours as needed for Pain., Disp: 60 tablet, Rfl: 0    insulin lispro (HUMALOG) 100 unit/mL injection, Inject into the skin 3 (three) times daily before meals., Disp: , Rfl:     lidocaine HCl 2% (XYLOCAINE) 2 % Soln, by Mucous Membrane route every 4 (four) hours., Disp: 100 mL, Rfl: 5    linagliptin (TRADJENTA) 5 mg Tab tablet, Take 5 mg by mouth once daily., Disp: , Rfl:     potassium chloride (MICRO-K) 10 MEQ CpSR, Take 10 mEq by mouth once., Disp: , Rfl:     silver sulfADIAZINE 1% (SILVADENE) 1 % cream, Apply topically 2 (two) times daily., Disp: 400 g, Rfl: 2    spironolactone (ALDACTONE) 50 MG tablet, Take 50 mg by mouth once daily., Disp: , Rfl:     terazosin (HYTRIN) 5 MG capsule, Take 5 mg by mouth every evening., Disp: , Rfl:     travoprost, benzalkonium, (TRAVATAN) 0.004 % ophthalmic solution, 1 drop every evening., Disp: , Rfl:     PMHx/PSHx Updates:  See patient's last visit with me on 8/28/2018.  See H&P on 5/9/2018        Pathology:    Left Neck FNA: 4/10/2018  FINAL DIAGNOSIS:  LEFT CERVICAL (NECK) MASS, NEEDLE  BIOPSY:    - MODERATELY DIFFERENTIATED SQUAMOUS CELL CARCINOMA.          Objective:     Vitals:  Blood pressure (!) 179/65, pulse (!) 55, temperature 98.6 °F (37 °C), resp. rate 18, weight 80 kg (176 lb 5.9 oz).    Physical Examination:   GEN: no apparent distress, comfortable; AAOx3  HEAD: atraumatic and normocephalic  EYES: no pallor, no icterus, PERRLA  ENT: OMM, no pharyngeal erythema, external ears WNL; no nasal discharge; no thrush  NECK: no masses, thyroid normal, trachea midline, no LAD/LN's, supple  CV: RRR with no murmur; normal pulse; normal S1 and S2; no pedal edema  CHEST: Normal respiratory effort; CTAB; normal breath sounds; no wheeze or crackles  ABDOM: nontender and nondistended; soft; normal bowel sounds; no rebound/guarding  MUSC/Skeletal: ROM normal; no crepitus; joints normal; no deformities or arthropathy  EXTREM: no clubbing, cyanosis, inflammation or swelling  SKIN: no rashes, lesions, ulcers, petechiae or subcutaneous nodules; radiation burn on left neck resolved  : no gregg  NEURO: grossly intact; motor/sensory WNL; AAOx3; no tremors  PSYCH: normal mood, affect and behavior  LYMPH: normal cervical, supraclavicular, axillary and groin LN's            Labs:     9/26/2018:    Lab Results   Component Value Date    WBC 2.8 (L) 09/26/2018    HGB 10.3 (L) 09/26/2018    HCT 32.1 (L) 09/26/2018     09/26/2018     BMP  Lab Results   Component Value Date     (L) 09/26/2018    K 5.1 (H) 09/26/2018    CL 98 09/26/2018    CO2 26.7 09/26/2018    BUN 37 (H) 09/26/2018    CREATININE 1.56 (H) 09/26/2018    CALCIUM 9.3 09/26/2018     Lab Results   Component Value Date    AST 28 09/26/2018    ALKPHOS 46 09/26/2018    BILITOT 0.7 09/26/2018           Radiology/Diagnostic Studies:    No results found.    I have reviewed all available lab results and radiology reports.    Assessment/Plan:   (1) 88 y.o. male with diagnosis of newly diagnosed metastatic squamous cell esophageal CA  who has been  referred by Dr Andrews Denis for evaluation by medical hematology/oncology. He originally presented with an enlarging neck mass and was seen by Dr Mg and had an FNA which showed metastatic squamous cell carcinoma. He subsequently saw Dr Casillas at [facility]NT and had laryngoscope which was essentially unremarkable. PET scan was done on 4/25/2018 with discovery of an upper esophageal primary mass which is suspected primary origin.       - CT scan was also performed which showed lymphadenopathy at level IB measuring 2.8 cm level II measuring 3.5 cm and level III measuring 2.5 cm. The PET scan also confirmed a soft tissue intraluminal esophageal mass at the C6-C7 level measuring 3.4 x 2.9 cm with an SUV of 9.9 consistent with a neoplastic process.     - he had EGD with Dr Arreguin and had biopsy of the esophageal mass and peg tube placed     - He is of advanced age and has numerous co-morbidities including ESRD and CHF. I do not feel he would be a good candidate for chemotherapy or combined modality therapy. Immunologic therapy with Opdivo or Keytruda has not been approved for first line use in Esophageal Squamous Cell carcinomas, but will re-evaluate post monotherapy XRT.      - I previously reviewed the latest NCCN data version 1.2018    - he completed monotherapy with XRT and finished up on 7/3/2018; he seems to have done ok with the XRT and was swallowing with some difficulty and relying on tube feeds  - I discussed with Anita with nutrition and he passed his swallow test and is doing better overall and maintaining his weight, with expectation that he may be able to have his tube removed in a couple of weeks  - a f/u PET scan has been ordered by Dr Hunt for Oct 10th       (2) ESRD - no on HD - followed by Dr Winkler; he saw Dr Galicia previously and is on IV iron     (3) DM     (4) CHF and CAD s/p CABG x4 in past - followed by Dr Pepper       (5) Chronic anemia - most likely multifactorial process with  underlying anemia of chronic disorders and anemia of chronic renal disease at least  - he is on procrit  - hgb is currently 9.2  - required blood transfusion at end of May 2018     (6) Urologic/prostate issues - followed by Dr Toledo - ureter issues; elevated PSA          Squamous cell esophageal cancer          PLAN:  1. Monitor labs, continue procrit per Dr Galicia's direction; transfuse as needed  2. F/u with ENT with Dr Foy in near future  3. Will probably need EGD and repeat laryngoscope   4. He is scheduled for PET on Oct 10th  5. Discussed consideration for immunologic therapy with Keytruda every three weeks once he fully recovers  6. F/u with PCP, Neph, Card, , etc  7. RTC in 3-4 weeks    Fax note to Keyshawn Denis Tveit, Evgeny, [place], Sonja Arreguin    Discussion:       I spent over 25 mins of time with the patient. Reviewed results of the recently ordered labs, tests and studies; made directives with regards to the results. Over half of this time was spent couseling and coordinating care.    I have explained all of the above in detail and the patient understands all of the current recommendation(s). I have answered all of their questions to the best of my ability and to their complete satisfaction.   The patient is to continue with the current management plan.            Electronically signed by Huey Nieto MD

## 2018-09-27 NOTE — LETTER
September 27, 2018      Regan Mahajan MD  [address] [facility] [address]  [facility] [address]           [facility] - Hematology Oncology  [address]  [address]  [place] [address]  Phone: [phone]  Fax: [phone]          Patient: Con Stanton   MR Number: [id number]   YOB: 1930   Date of Visit: 9/27/2018       Dear Dr. Regan Mahajan:    Thank you for referring Con Stanton to me for evaluation. Attached you will find relevant portions of my assessment and plan of care.    If you have questions, please do not hesitate to call me. I look forward to following Con Stanton along with you.    Sincerely,    Huey Nieto MD    Enclosure  CC:  No Recipients    If you would like to receive this communication electronically, please contact [email or web address] or [phone] to request more information on [employer] Link access.    For providers and/or their staff who would like to refer a patient to [facility], please contact us through our one-stop-shop provider referral line, [facility], at [phone].    If you feel you have received this communication in error or would no longer like to receive these types of communications, please e-mail [email or web address]

## 2018-10-24 NOTE — PROGRESS NOTES
[facility] Hematology/Oncology  PROGRESS NOTE       Subjective:       Patient ID:   NAME: Con Stanton : 1930     88 y.o. male    Referring Doc: Cira  Other Physicians: Sonja ([facility]-ENT), Soto Mg (ENT); Regan Mahajan (PCP); Grayson; Evgeny; Kallie Toledo    Chief Complaint:  SCCA esophageal ca f/u    History of Present Illness:     Patient returns today for a regularly scheduled follow-up visit.  The patient is here today to go over the results of the recently ordered labs, tests and studies. He has completed monotherapy with XRT per direction of Dr Denis on 7/3. He is swallowing better and is gaining weight. He is breathing ok. He denies any CP, SOB, HA's or N/V. The prior XRT neck burns are resolved. He saw Dr Hunt on 2018. He is here with his wife and son.    He recently had a follow-up PET and is here to go over the results. I discussed with Dr Hunt recently about the report. He sees Dr Foy in 2018. He sees Dr Hunt next week.       ROS:   GEN: normal without any fever, night sweats or weight loss  HEENT: normal with no HA's, sore throat, stiff neck, changes in vision  CV: normal with no CP, SOB, PND, SCHUMACHER or orthopnea  PULM: normal with no SOB, cough, hemoptysis, sputum or pleuritic pain  GI: normal with no abdominal pain, nausea, vomiting, constipation, diarrhea, melanotic stools, BRBPR, or hematemesis  : normal with no hematuria, dysuria  BREAST: normal with no mass, discharge, pain  SKIN: normal with no rash, erythema, bruising, or swelling    Allergies:  Review of patient's allergies indicates:   Allergen Reactions    Ceftin [cefuroxime axetil]     Codeine     Levaquin [levofloxacin]     Prednisone     Remeron [mirtazapine]     Ultracet [tramadol-acetaminophen]        Medications:    Current Outpatient Medications:     ALPRAZolam (XANAX) 0.25 MG tablet, Take 0.25 mg by mouth 3 (three) times daily., Disp: , Rfl:     aspirin (ECOTRIN) 81 MG EC  tablet, Take 81 mg by mouth once daily., Disp: , Rfl:     atorvastatin calcium (LIPITOR ORAL), Take by mouth., Disp: , Rfl:     citalopram (CELEXA) 20 MG tablet, Take 20 mg by mouth once daily., Disp: , Rfl:     cloNIDine 0.2 mg/24 hr td ptwk (CATAPRES) 0.2 mg/24 hr, Place 1 patch onto the skin every 7 days., Disp: , Rfl:     epoetin sunitha (PROCRIT) 10,000 unit/mL injection, Inject into the skin., Disp: , Rfl:     esomeprazole (NEXIUM) 40 MG capsule, Take 40 mg by mouth before breakfast., Disp: , Rfl:     fenofibrate 160 MG Tab, Take 160 mg by mouth once daily., Disp: , Rfl:     furosemide (LASIX) 20 MG tablet, Take 20 mg by mouth 2 (two) times daily., Disp: , Rfl:     hydrALAZINE (APRESOLINE) 50 MG tablet, Take 50 mg by mouth 3 (three) times daily., Disp: , Rfl:     hydrocodone-acetaminophen (HYCET) solution 7.5-325 mg/15mL, Take 15 mLs by mouth every 4 to 6 hours as needed for Pain., Disp: 750 mL, Rfl: 0    HYDROcodone-acetaminophen (NORCO) 7.5-325 mg per tablet, Take 1 tablet by mouth every 4 to 6 hours as needed for Pain., Disp: 60 tablet, Rfl: 0    insulin lispro (HUMALOG) 100 unit/mL injection, Inject into the skin 3 (three) times daily before meals., Disp: , Rfl:     lidocaine HCl 2% (XYLOCAINE) 2 % Soln, by Mucous Membrane route every 4 (four) hours., Disp: 100 mL, Rfl: 5    linagliptin (TRADJENTA) 5 mg Tab tablet, Take 5 mg by mouth once daily., Disp: , Rfl:     potassium chloride (MICRO-K) 10 MEQ CpSR, Take 10 mEq by mouth once., Disp: , Rfl:     silver sulfADIAZINE 1% (SILVADENE) 1 % cream, Apply topically 2 (two) times daily., Disp: 400 g, Rfl: 2    spironolactone (ALDACTONE) 50 MG tablet, Take 50 mg by mouth once daily., Disp: , Rfl:     terazosin (HYTRIN) 5 MG capsule, Take 5 mg by mouth every evening., Disp: , Rfl:     travoprost, benzalkonium, (TRAVATAN) 0.004 % ophthalmic solution, 1 drop every evening., Disp: , Rfl:     PMHx/PSHx Updates:  See patient's last visit with me on  9/27/2018.  See H&P on 5/9/2018        Pathology:    Left Neck FNA: 4/10/2018  FINAL DIAGNOSIS:  LEFT CERVICAL (NECK) MASS, NEEDLE BIOPSY:    - MODERATELY DIFFERENTIATED SQUAMOUS CELL CARCINOMA.          Objective:     Vitals:  Blood pressure (!) 177/61, pulse (!) 59, temperature 97.5 °F (36.4 °C), resp. rate 20, weight 82.1 kg (180 lb 14.4 oz).    Physical Examination:   GEN: no apparent distress, comfortable; AAOx3  HEAD: atraumatic and normocephalic  EYES: no pallor, no icterus, PERRLA  ENT: OMM, no pharyngeal erythema, external ears WNL; no nasal discharge; no thrush  NECK: no masses, thyroid normal, trachea midline, no LAD/LN's, supple  CV: RRR with no murmur; normal pulse; normal S1 and S2; no pedal edema  CHEST: Normal respiratory effort; CTAB; normal breath sounds; no wheeze or crackles  ABDOM: nontender and nondistended; soft; normal bowel sounds; no rebound/guarding  MUSC/Skeletal: ROM normal; no crepitus; joints normal; no deformities or arthropathy  EXTREM: no clubbing, cyanosis, inflammation; RUE swollen   SKIN: no rashes, lesions, ulcers, petechiae or subcutaneous nodules; radiation burn on left neck resolved  : no gregg  NEURO: grossly intact; motor/sensory WNL; AAOx3; no tremors  PSYCH: normal mood, affect and behavior  LYMPH: normal cervical, supraclavicular, axillary and groin LN's            Labs:     10/24/2018    Lab Results   Component Value Date    WBC 3.5 (L) 10/24/2018    HGB 10.4 (L) 10/24/2018    HCT 32.3 (L) 10/24/2018     (L) 10/24/2018     BMP  Lab Results   Component Value Date     10/24/2018    K 4.3 10/24/2018     10/24/2018    CO2 26.7 10/24/2018    BUN 39 (H) 10/24/2018    CREATININE 1.40 10/24/2018    CALCIUM 8.6 10/24/2018     Lab Results   Component Value Date    AST 21 10/24/2018    ALKPHOS 52 10/24/2018    BILITOT 0.4 10/24/2018           Radiology/Diagnostic Studies:    PET 10/10/2018:    IMPRESSION:  1. Single enlarged, hypermetabolic leftward posterior  mediastinal  paraesophageal lymph node, compatible with metastatic disease.  2. A 10 mm noncalcified pulmonary nodule in the right lower lobe is not hypermetabolic, but is nonetheless suspicious for metastatic disease. Correlation with prior outside imaging is needed.  3. No additional evidence for metastatic disease.  4. Small bilateral pleural effusions.  5. Additional observations as above.      I have reviewed all available lab results and radiology reports.    Assessment/Plan:   (1) 88 y.o. male with diagnosis of newly diagnosed metastatic squamous cell esophageal CA  who has been referred by Dr Andrews Denis for evaluation by medical hematology/oncology. He originally presented with an enlarging neck mass and was seen by Dr Mg and had an FNA which showed metastatic squamous cell carcinoma. He subsequently saw Dr Casillas at [facility] and had laryngoscope which was essentially unremarkable. PET scan was done on 4/25/2018 with discovery of an upper esophageal primary mass which is suspected primary origin.       - CT scan was also performed which showed lymphadenopathy at level IB measuring 2.8 cm level II measuring 3.5 cm and level III measuring 2.5 cm. The PET scan also confirmed a soft tissue intraluminal esophageal mass at the C6-C7 level measuring 3.4 x 2.9 cm with an SUV of 9.9 consistent with a neoplastic process.     - he had EGD with Dr Arreguin and had biopsy of the esophageal mass and peg tube placed     - He is of advanced age and has numerous co-morbidities including ESRD and CHF. I do not feel he would be a good candidate for chemotherapy or combined modality therapy. Immunologic therapy with Opdivo or Keytruda has not been approved for first line use in Esophageal Squamous Cell carcinomas, but will re-evaluate post monotherapy XRT.      - I previously reviewed the latest NCCN data version 1.2018    - he completed monotherapy with XRT and finished up on 7/3/2018; he seems to have done ok  with the XRT and was swallowing with some difficulty and relying on tube feeds  - I discussed with Anita with nutrition and he passed his swallow test and is doing better overall and maintaining his weight, with expectation that he may be able to have his tube removed in a couple of weeks  - a f/u PET scan had been ordered by Dr Hunt for Oct 10th and I discussed with Dr Hunt the other day about the report  - he unfortunately appears to have some progressive disease  - again, I do not think he would be a good candidate for chemotherapy given his age and co-morbidities  - will look in to getting compassionate use approval for Keytruda  - I discussed the side-effect profiles of immunologic therapies including colitis, transaminitis, and pneumonitis which could be potentially fatal       (2) ESRD - no on HD - followed by Dr Winkler; he saw Dr Galicia previously and is on IV iron     (3) DM     (4) CHF and CAD s/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 Monitor labs, continue procrit per Dr Galicia's direction; transfuse as needed  2. F/u with ENT with Dr Foy as per their directives  3. He needs EGD and repeat laryngoscope prior to starting therapy  4.  Set up chemotherapy school with Mayi Last  5. Discussed consideration for immunologic therapy with Keytruda every three weeks and he is willing to consider  6. F/u with PCP, Neph, Card, , etc  7. Order US of RUE to rule out DVT  8. RTC in 2-3 weeks    Fax note to Keyshawn Denis Tveit, Evgeny, [place], Kallie,  Sonja    Discussion:       I spent over 25 mins of time with the patient. Reviewed results of the recently ordered labs, tests and studies; made directives with regards to the results. Over half of this time was spent couseling and coordinating care.    I have explained all of the above in detail and the patient understands all of the current recommendation(s). I have answered all of their questions to the best of my ability and to their complete satisfaction.   The patient is to continue with the current management plan.      I discussed the available treatment option(s) in accordance with the latest NCCN Guidelines, their overall age and their co-morbidities. I went over the risks and benefits of the immunologic therapy with or without chemotherapy with regard to their particular cancer type, their cancer stage, their age, and their co-morbidities. I provided literature on the immunologic/chemotherapy regimen and discussed the immunologic/chemotherapy side-effect profiles of the drug(s). I discussed the importance of compliance with obtaining and monitoring weekly lab work, and went over the potential hematopathology issues and risks with anemia, leucopenia and thrombocytopenia that can occur with immunologic/chemotherapy. I discussed the potential risks of liver and kidney damage, which could be permanent and could necessitate dialysis long-term if kidney failure developed. I discussed the emetic and/or diarrheal potential of the regimen and the potential need for use of antiemetic and anti-diarrheal medications. I discussed the risk for development of anaphylactic shock, bronchospasm, dysrhythmia, and respiratory/cardiovascular failure. I discussed the potential risks for development of alopecia, cold sensory issues, ringing in ears, vertigo and neuropathy, all of which could end up being chronic and life-long. I discussed the risks of hand-foot syndrome and rashes, and development of other autoimmune  mediated processes such as pneumonitis and colitis which could be life threatening. I discussed the risks of the potential development of leukemia and/or lymphoma from use of certain immunologic/chemotherapy agents. I discussed the need for neutropenic precautions, basic hygiene/sanitation behaviors and dietary restrictions.    The patient's consent has been obtained to proceed with the immunologic/chemotherapy.The patient will be referred to Chemotherapy [facility] for training and education on immunologic/chemotherapy, use of antiemetics and/or anti-diarrheals, use of NSAID's, potential immunologic/chemotherapy side-effects, and any specific recommendations and precautions with the particular immunologic and/or chemotherapy agents.      I answered all of the patient's (and family's, if applicable) questions to the best of my ability and to their complete satisfaction. The patient acknowledged full understanding of the risks, recommendations and plan(s).       Electronically signed by Huey Nieto MD

## 2018-10-25 NOTE — LETTER
October 25, 2018      No Recipients           [facility] - Hematology Oncology  [address]  [address]  [place] [address]  Phone: [phone]  Fax: [phone]          Patient: Con Stanton   MR Number: [id number]   YOB: 1930   Date of Visit: 10/25/2018       Dear Dr. Regan Mahajan:    Thank you for referring Con Stanton to me for evaluation. Attached you will find relevant portions of my assessment and plan of care.    If you have questions, please do not hesitate to call me. I look forward to following Con Stanton along with you.    Sincerely,    Huey Nieto MD    Enclosure  CC:  No Recipients    If you would like to receive this communication electronically, please contact [email or web address] or [phone] to request more information on [employer] access.    For providers and/or their staff who would like to refer a patient to [facility], please contact us through our one-stop-shop provider referral line, Montse Brenner, at [phone].    If you feel you have received this communication in error or would no longer like to receive these types of communications, please e-mail [email or web address]

## 2018-10-31 NOTE — TELEPHONE ENCOUNTER
Spoke to Danielle Saavedra today. She was able to get Keytruda from the manufactory for him. I called patient to schedule and he said that he just got out of the hospital last night due to elevated blood pressures. He was told that this needs to be brought under control before starting a new chemo. He sees his cardiologist , Dr. Pepper next week to follow up and also sees us on 11/8/18. I told him that we will just hold off for now and will proceed when the doctors say it is ok to. Dr. Nieto also notified.

## 2018-10-31 NOTE — PROGRESS NOTES
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 Mg for sinus problems and a left-sided neck mass was discovered. He underwent a fine-needle aspirate of the neck mass making a diagnosis squamous cell carcinoma.  He presented to Dr. Foy, for workup which included a PET scan dated 04/25/2018. This study revealed multiple lymph nodes in the neck specifically to large hypermetabolic level II nodes consistent with neoplastic disease a CT scan was also performed which showed lymphadenopathy at level IB measuring 2.8 cm level II measuring 3.5 cm and level III measuring 2.5 cm. The PET scan also confirmed a soft tissue intraluminal esophageal mass at the C6-C7 level measuring 3.4 x 2.9 cm with an SUV of 9.9 consistent with a neoplastic process.  There is no evidence of distant metastatic disease.    Dr. 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    Patient was treated with definitive radiotherapy alone, 70 gray to neck disease, 60 gray to gross disease at esophagus, 56 gray B elective necks ending 7/18.  Patient was treated with a PEG tube in placed by the end of therapy this is his main source of nutrition. He did have significant radiation dermatitis as well as mucositis extending through the oropharynx. He had significant fatigue but tolerated therapy very well without treatment breaks. He was very determined and had excellent support.    INTERVAL HISTORY:   Since completion of therapy patient reports he did have a brief setback when he was hospitalized for atypical pneumonia and placed on doxycycline. He has completed his  outpatient antibiotics.     Most recently he has developed right upper extremity swelling and was unable to tolerate Bactrim and was placed on minocycline. Upper extremity ultrasound was negative for DVT.    He presents with updated PET CT scan. He has had his PEG tube removed and is 100% by mouth tolerant with all caloric intake. He continues with xerostomia and dysgeusia; denies cough or hemoptysis.    Review of Systems   Constitutional: Positive for appetite change and fatigue. Negative for chills, fever and unexpected weight change.   HENT:   Positive for sore throat, trouble swallowing and voice change. Negative for lump/mass and mouth sores.    Eyes: Negative for eye problems and icterus.   Respiratory: Negative for chest tightness, cough, hemoptysis and shortness of breath.    Cardiovascular: Negative for chest pain and leg swelling.   Gastrointestinal: Negative for abdominal distention, abdominal pain, blood in stool, constipation, diarrhea, nausea and vomiting.   Genitourinary: Negative for bladder incontinence, difficulty urinating, dysuria, frequency, hematuria and nocturia.    Musculoskeletal: Positive for neck stiffness. Negative for back pain, gait problem and neck pain.   Neurological: Negative for extremity weakness, gait problem, headaches, numbness and seizures.   Hematological: Negative for adenopathy.     Past Medical History:   Diagnosis Date    Anemia     Arthritis     Cancer     neck    CHF (congestive heart failure)     Diabetes mellitus     Disorder of kidney and ureter      Past Surgical History:   Procedure Laterality Date    APPENDECTOMY      BOWEL RESECTION      CORONARY ANGIOPLASTY WITH STENT PLACEMENT      INGUINAL HERNIA REPAIR      NV CABG, ARTERY-VEIN, FOUR      Coronary Artery Bypass, 4    PROSTATE SURGERY       Social History     Socioeconomic History    Marital status:      Spouse name: None    Number of children: None    Years of education: None    Highest  education level: None   Social Needs    Financial resource strain: None    Food insecurity - worry: None    Food insecurity - inability: None    Transportation needs - medical: None    Transportation needs - non-medical: None   Occupational History    None   Tobacco Use    Smoking status: Never Smoker    Smokeless tobacco: Never Used   Substance and Sexual Activity    Alcohol use: No    Drug use: None    Sexual activity: None   Other Topics Concern    None   Social History Narrative    None     History reviewed. No pertinent family history.     Medication List           Accurate as of 10/31/18  3:35 PM. If you have any questions, ask your nurse or doctor.               CONTINUE taking these medications    ALPRAZolam 0.25 MG tablet  Commonly known as:  XANAX     aspirin 81 MG EC tablet  Commonly known as:  ECOTRIN     citalopram 20 MG tablet  Commonly known as:  CELEXA     cloNIDine 0.2 mg/24 hr td ptwk 0.2 mg/24 hr  Commonly known as:  CATAPRES     esomeprazole 40 MG capsule  Commonly known as:  NEXIUM     fenofibrate 160 MG Tab     furosemide 20 MG tablet  Commonly known as:  LASIX     hydrALAZINE 50 MG tablet  Commonly known as:  APRESOLINE     * hydrocodone-apap 7.5-325 MG/15 ML oral solution  Commonly known as:  HYCET  Take 15 mLs by mouth every 4 to 6 hours as needed for Pain.     * HYDROcodone-acetaminophen 7.5-325 mg per tablet  Commonly known as:  NORCO  Take 1 tablet by mouth every 4 to 6 hours as needed for Pain.     insulin lispro 100 unit/mL injection  Commonly known as:  HUMALOG     lidocaine HCl 2% 2 % Soln  Commonly known as:  XYLOCAINE  by Mucous Membrane route every 4 (four) hours.     LIPITOR ORAL     potassium chloride 10 MEQ Cpsr  Commonly known as:  MICRO-K     PROCRIT 10,000 unit/mL injection  Generic drug:  epoetin sunitha     silver sulfADIAZINE 1% 1 % cream  Commonly known as:  SILVADENE  Apply topically 2 (two) times daily.     spironolactone 50 MG tablet  Commonly known as:   ALDACTONE     sulfamethoxazole-trimethoprim 400-80mg 400-80 mg per tablet  Commonly known as:  BACTRIM  Take 1 tablet by mouth 2 (two) times daily. for 10 days     terazosin 5 MG capsule  Commonly known as:  HYTRIN     TRADJENTA 5 mg Tab tablet  Generic drug:  linagliptin     travoprost (benzalkonium) 0.004 % ophthalmic solution  Commonly known as:  TRAVATAN         * This list has 2 medication(s) that are the same as other medications prescribed for you. Read the directions carefully, and ask your doctor or other care provider to review them with you.              Review of patient's allergies indicates:   Allergen Reactions    Ceftin [cefuroxime axetil]     Codeine     Levaquin [levofloxacin]     Prednisone     Remeron [mirtazapine]     Ultracet [tramadol-acetaminophen]        QUALITY OF LIFE: 80%- Normal Activity with Effort: Some Symptoms of Disease    Vitals:    10/31/18 1032   BP: (!) 167/64   Pulse: 62   Weight: 82.2 kg (181 lb 3.2 oz)   PainSc: 0-No pain       PHYSICAL EXAM:   GENERAL: alert; in no apparent distress.   HEAD: normocephalic, atraumatic.  EYES: pupils are equal, round, reactive to light and accommodation. Sclera anicteric. Conjunctiva not injected.   NOSE/THROAT: no nasal erythema or rhinorrhea.   NECK: no cervical motion rigidity; left mid neck firmness proximal   CHEST: clear to auscultation bilaterally; no wheezes, crackles or rubs. Patient is speaking comfortably on room air with normal work of breathing without using accessory muscles of respiration. No signs of pneumonia or aspiration  CARDIOVASCULAR: regular rate and rhythm  ABDOMEN: soft, nontender, nondistended. Bowel sounds present.   MUSCULOSKELETAL: no tenderness to palpation along the spine or scapulae. Normal range of motion.  NEUROLOGIC: cranial nerves II-XII intact bilaterally. Strength 5/5 in bilateral upper and lower extremities. No sensory deficits appreciated. Normal gait.  LYMPHATIC: No palpable  lymphadenopathy  EXTREMITIES: no clubbing, cyanosis, edema.  SKIN: Mild to moderate fibrosis of the bilateral neck    ANCILLARY DATA:   10/18 PET/CT  1. Single enlarged, hypermetabolic leftward posterior mediastinal  paraesophageal lymph node, compatible with metastatic disease.  2. A 10 mm noncalcified pulmonary nodule in the right lower lobe is not  hypermetabolic, but is nonetheless suspicious for metastatic disease.  Correlation with prior outside imaging is needed.  3. No additional evidence for metastatic disease.  4. Small bilateral pleural effusions.  5. Additional observations as above.    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  PLAN:  Mr. Stanton was very vigilant throughout his radiotherapy and did maintain his weight relatively well. He worked throughout with our dietitian and followed all of our support instructions. He maintained good mouth health with baking soda salt rinses. He has had his PEG tube removed and his full by mouth tolerant receiving all calories by mouth. Very pleased with his continued recovery. Updated PET CT scan with out of field mid-low maria e-esophageal lymph node. Given the FDG activity this is concerning for malignancy. Also noteworthy finding at right lower lobe which will require trending with further scans. No evidence of disease within the radiation portals on PET/CT imaging. He is meeting with Dr. Arreguin for endoscopic assessment of response as well as Dr. Foy for DFL. I recommended he continue to follow with Dr. Nieto and complete his antibiotic therapy for phlebitis/edema of the right upper extremity with no imaging able to detect clot at this time. He is planned for immunotherapy which I discussed with Dr. Nieto given the out of field failure and need for systemic protection. I've asked him to return to clinic in 3 months.    All questions answered and contact information  provided. Patient understands free to call us anytime with any questions or concerns regarding radiation therapy.    I have personally seen and evaluated this patient. Greater than 50% of this time was spent discussing coordination of care and/or counseling.    PHYSICIAN: Juan Hunt Jr, MD

## 2018-10-31 NOTE — PROGRESS NOTES
I met with the patient, wife, and son today for chemotherapy education. he will be starting treatment with Keytruda. We discussed the mechanism of action, potential side effects of this treatment as well as ways he can manage them at home. Some of these side effects include but or not limited to fever, nausea, vomiting, decreased appetite, fatigue, weakness, cytopenias, myalgia/arthralgia, constipation, diarrhea, bleeding, headache, shortness of breath, nail changes, taste change, hair thinning/loss, mood disturbances, or edema. We also discussed dietary modifications he should make although this will be discussed in more detail with the dietician. he was provided with a copy of all of the information we discussed today as well as our contact information. he will be provided a schedule on his first day of treatment. We will obtain labs on a weekly basis and the patient will follow-up with the physician for toxicity monitoring throughout treatment. All questions were answered and an informed consent was obtained. he was reminded to certainly contact us sooner if needed.

## 2018-11-07 NOTE — PROGRESS NOTES
[facility] Hematology/Oncology  PROGRESS NOTE       Subjective:       Patient ID:   NAME: Con Stanton : 1930     88 y.o. male    Referring Doc: Cira  Other Physicians: Sonja ([facility]), Soto Mg (ENT); Regan Mahajan (PCP); Grayson; Evgeny; Kallie Toledo    Chief Complaint:  SCCA esophageal ca f/u    History of Present Illness:     Patient returns today for a regularly scheduled follow-up visit.  The patient is here today to go over the results of the recently ordered labs, tests and studies. He has completed monotherapy with XRT per direction of Dr Denis on 7/3. He is swallowing better and is gaining weight. He is breathing ok. He denies any CP, SOB, HA's or N/V. The prior XRT neck burns are resolved. He saw Dr Hunt on 2018. He is here with his wife.    He was recently suppose to start Keytruda on 2018 but it was held due to HBP and swelling in arms. He saw Dr Pepper this past Tuesday and he was added on norvasc 10mg. He sees Tranchina tomorrow. He was on oral antibiotics for possible cellulitis in the RUE which is much improved. His last antibiotic is today.       ROS:   GEN: normal without any fever, night sweats or weight loss  HEENT: normal with no HA's, sore throat, stiff neck, changes in vision  CV: normal with no CP, SOB, PND, SCHUMACHER or orthopnea  PULM: normal with no SOB, cough, hemoptysis, sputum or pleuritic pain  GI: normal with no abdominal pain, nausea, vomiting, constipation, diarrhea, melanotic stools, BRBPR, or hematemesis  : normal with no hematuria, dysuria  BREAST: normal with no mass, discharge, pain  SKIN: normal with no rash, erythema, bruising, or swelling    Allergies:  Review of patient's allergies indicates:   Allergen Reactions    Ceftin [cefuroxime axetil]     Codeine     Levaquin [levofloxacin]     Prednisone     Remeron [mirtazapine]     Ultracet [tramadol-acetaminophen]        Medications:    Current Outpatient Medications:      ALPRAZolam (XANAX) 0.25 MG tablet, Take 0.25 mg by mouth 3 (three) times daily., Disp: , Rfl:     aspirin (ECOTRIN) 81 MG EC tablet, Take 81 mg by mouth once daily., Disp: , Rfl:     atorvastatin calcium (LIPITOR ORAL), Take by mouth., Disp: , Rfl:     citalopram (CELEXA) 20 MG tablet, Take 20 mg by mouth once daily., Disp: , Rfl:     cloNIDine 0.2 mg/24 hr td ptwk (CATAPRES) 0.2 mg/24 hr, Place 1 patch onto the skin every 7 days., Disp: , Rfl:     epoetin sunitha (PROCRIT) 10,000 unit/mL injection, Inject into the skin., Disp: , Rfl:     esomeprazole (NEXIUM) 40 MG capsule, Take 40 mg by mouth before breakfast., Disp: , Rfl:     fenofibrate 160 MG Tab, Take 160 mg by mouth once daily., Disp: , Rfl:     furosemide (LASIX) 20 MG tablet, Take 20 mg by mouth 2 (two) times daily., Disp: , Rfl:     hydrALAZINE (APRESOLINE) 50 MG tablet, Take 50 mg by mouth 3 (three) times daily., Disp: , Rfl:     hydrocodone-acetaminophen (HYCET) solution 7.5-325 mg/15mL, Take 15 mLs by mouth every 4 to 6 hours as needed for Pain., Disp: 750 mL, Rfl: 0    HYDROcodone-acetaminophen (NORCO) 7.5-325 mg per tablet, Take 1 tablet by mouth every 4 to 6 hours as needed for Pain., Disp: 60 tablet, Rfl: 0    insulin lispro (HUMALOG) 100 unit/mL injection, Inject into the skin 3 (three) times daily before meals., Disp: , Rfl:     lidocaine HCl 2% (XYLOCAINE) 2 % Soln, by Mucous Membrane route every 4 (four) hours., Disp: 100 mL, Rfl: 5    linagliptin (TRADJENTA) 5 mg Tab tablet, Take 5 mg by mouth once daily., Disp: , Rfl:     potassium chloride (MICRO-K) 10 MEQ CpSR, Take 10 mEq by mouth once., Disp: , Rfl:     silver sulfADIAZINE 1% (SILVADENE) 1 % cream, Apply topically 2 (two) times daily., Disp: 400 g, Rfl: 2    spironolactone (ALDACTONE) 50 MG tablet, Take 50 mg by mouth once daily., Disp: , Rfl:     terazosin (HYTRIN) 5 MG capsule, Take 5 mg by mouth every evening., Disp: , Rfl:     travoprost, benzalkonium, (TRAVATAN) 0.004  % ophthalmic solution, 1 drop every evening., Disp: , Rfl:     PMHx/PSHx Updates:  See patient's last visit with me on 10/25/2018.  See H&P on 5/9/2018        Pathology:    Left Neck FNA: 4/10/2018  FINAL DIAGNOSIS:  LEFT CERVICAL (NECK) MASS, NEEDLE BIOPSY:    - MODERATELY DIFFERENTIATED SQUAMOUS CELL CARCINOMA.          Objective:     Vitals:  Blood pressure (!) 148/57, pulse 67, temperature 97.7 °F (36.5 °C), temperature source Oral, resp. rate 18, weight 78.5 kg (173 lb).    Physical Examination:   GEN: no apparent distress, comfortable; AAOx3  HEAD: atraumatic and normocephalic  EYES: no pallor, no icterus, PERRLA  ENT: OMM, no pharyngeal erythema, external ears WNL; no nasal discharge; no thrush  NECK: no masses, thyroid normal, trachea midline, no LAD/LN's, supple  CV: RRR with no murmur; normal pulse; normal S1 and S2; no pedal edema  CHEST: Normal respiratory effort; CTAB; normal breath sounds; no wheeze or crackles  ABDOM: nontender and nondistended; soft; normal bowel sounds; no rebound/guarding  MUSC/Skeletal: ROM normal; no crepitus; joints normal; no deformities or arthropathy  EXTREM: no clubbing, cyanosis, inflammation; RUE swelling is much improved as is the erythema  SKIN: no rashes, lesions, ulcers, petechiae or subcutaneous nodules; radiation burn on left neck resolved  : no gregg  NEURO: grossly intact; motor/sensory WNL; AAOx3; no tremors  PSYCH: normal mood, affect and behavior  LYMPH: normal cervical, supraclavicular, axillary and groin LN's            Labs:     11/7/2018    Lab Results   Component Value Date    WBC 3.0 (L) 11/07/2018    HGB 10.2 (L) 11/07/2018    HCT 30.4 (L) 11/07/2018     (L) 11/07/2018     BMP  Lab Results   Component Value Date     (L) 11/07/2018    K 5.1 (H) 11/07/2018    CL 95 (L) 11/07/2018    CO2 26.4 11/07/2018    BUN 55 (H) 11/07/2018    CREATININE 1.78 (H) 11/07/2018    CALCIUM 9.0 11/07/2018     Lab Results   Component Value Date    AST 23  11/07/2018    ALKPHOS 51 11/07/2018    BILITOT 0.6 11/07/2018           Radiology/Diagnostic Studies:    PET 10/10/2018:    IMPRESSION:  1. Single enlarged, hypermetabolic leftward posterior mediastinal  paraesophageal lymph node, compatible with metastatic disease.  2. A 10 mm noncalcified pulmonary nodule in the right lower lobe is not hypermetabolic, but is nonetheless suspicious for metastatic disease. Correlation with prior outside imaging is needed.  3. No additional evidence for metastatic disease.  4. Small bilateral pleural effusions.  5. Additional observations as above.      I have reviewed all available lab results and radiology reports.    Assessment/Plan:   (1) 88 y.o. male with diagnosis of newly diagnosed metastatic squamous cell esophageal CA  who has been referred by Dr Andrews Denis for evaluation by medical hematology/oncology. He originally presented with an enlarging neck mass and was seen by Dr Mg and had an FNA which showed metastatic squamous cell carcinoma. He subsequently saw Dr Casillas at [facility]NT and had laryngoscope which was essentially unremarkable. PET scan was done on 4/25/2018 with discovery of an upper esophageal primary mass which is suspected primary origin.       - CT scan was also performed which showed lymphadenopathy at level IB measuring 2.8 cm level II measuring 3.5 cm and level III measuring 2.5 cm. The PET scan also confirmed a soft tissue intraluminal esophageal mass at the C6-C7 level measuring 3.4 x 2.9 cm with an SUV of 9.9 consistent with a neoplastic process.     - he had EGD with Dr Arreguin and had biopsy of the esophageal mass and peg tube placed     - He is of advanced age and has numerous co-morbidities including ESRD and CHF. I do not feel he would be a good candidate for chemotherapy or combined modality therapy. Immunologic therapy with Opdivo or Keytruda has not been approved for first line use in Esophageal Squamous Cell carcinomas, but will  re-evaluate post monotherapy XRT.      - I previously reviewed the latest NCCN data version 1.2018    - he completed monotherapy with XRT and finished up on 7/3/2018; he seems to have done ok with the XRT and was swallowing with some difficulty and relying on tube feeds  - I discussed with Anita with nutrition and he passed his swallow test and is doing better overall and maintaining his weight, with expectation that he may be able to have his tube removed in a couple of weeks  - a f/u PET scan had been ordered by Dr Hunt for Oct 10th and I discussed with Dr Hunt the other day about the report  - he unfortunately appears to have some progressive disease  - again, I do not think he would be a good candidate for chemotherapy given his age and co-morbidities  - will look in to getting compassionate use approval for Keytruda  - I discussed the side-effect profiles of immunologic therapies including colitis, transaminitis, and pneumonitis which could be potentially fatal  -  He was recently suppose to start Keytruda on 11/5/2018 but it was held due to HBP and swelling in arms.        (2) ESRD - no on HD - followed by Dr Winkler; he saw Dr Galicia previously and is on IV iron     (3) DM     (4) CHF and CAD s/p CABG x4 in past - followed by Dr Pepper       (5) Chronic anemia - most likely multifactorial process with underlying anemia of chronic disorders and anemia of chronic renal disease at least  - he is on procrit  - hgb is currently 10.2  - required blood transfusion at end of May 2018     (6) Urologic/prostate issues - followed by Dr Toledo - ureter issues; elevated PSA    (7) RUE - swelling improved with antibiotics          Squamous cell esophageal cancer          PLAN:  1. Monitor labs, continue procrit per Dr Galicia's direction; transfuse as needed  2. F/u with ENT with Dr Foy as per their directives  3. Proceed with keytruda therapy once he has had his scope with Dr Arreguin - hopefully in 2-3  weeks  4. F/u with PCP, Neph, Card, , etc       RTC in 3-4 weeks    Fax note to Keyshawn Denis Tveit, Morales, Neitzschman, Albright, [place]idlander    Discussion:       I spent over 25 mins of time with the patient. Reviewed results of the recently ordered labs, tests and studies; made directives with regards to the results. Over half of this time was spent couseling and coordinating care.    I have explained all of the above in detail and the patient understands all of the current recommendation(s). I have answered all of their questions to the best of my ability and to their complete satisfaction.   The patient is to continue with the current management plan.      I discussed the available treatment option(s) in accordance with the latest NCCN Guidelines, their overall age and their co-morbidities. I went over the risks and benefits of the immunologic therapy with or without chemotherapy with regard to their particular cancer type, their cancer stage, their age, and their co-morbidities. I provided literature on the immunologic/chemotherapy regimen and discussed the immunologic/chemotherapy side-effect profiles of the drug(s). I discussed the importance of compliance with obtaining and monitoring weekly lab work, and went over the potential hematopathology issues and risks with anemia, leucopenia and thrombocytopenia that can occur with immunologic/chemotherapy. I discussed the potential risks of liver and kidney damage, which could be permanent and could necessitate dialysis long-term if kidney failure developed. I discussed the emetic and/or diarrheal potential of the regimen and the potential need for use of antiemetic and anti-diarrheal medications. I discussed the risk for development of anaphylactic shock, bronchospasm, dysrhythmia, and respiratory/cardiovascular failure. I discussed the potential risks for development of alopecia, cold sensory issues, ringing in ears, vertigo and neuropathy, all of which  could end up being chronic and life-long. I discussed the risks of hand-foot syndrome and rashes, and development of other autoimmune mediated processes such as pneumonitis and colitis which could be life threatening. I discussed the risks of the potential development of leukemia and/or lymphoma from use of certain immunologic/chemotherapy agents. I discussed the need for neutropenic precautions, basic hygiene/sanitation behaviors and dietary restrictions.    The patient's consent has been obtained to proceed with the immunologic/chemotherapy.The patient will be referred to [facility] /[facility] for training and education on immunologic/chemotherapy, use of antiemetics and/or anti-diarrheals, use of NSAID's, potential immunologic/chemotherapy side-effects, and any specific recommendations and precautions with the particular immunologic and/or chemotherapy agents.      I answered all of the patient's (and family's, if applicable) questions to the best of my ability and to their complete satisfaction. The patient acknowledged full understanding of the risks, recommendations and plan(s).       Electronically signed by Huey Nieto MD

## 2018-11-08 NOTE — TELEPHONE ENCOUNTER
Called alexandro got patient put on chemo schedule  2 weeks just as a place lujan  It is nov 19th

## 2018-11-19 NOTE — PROGRESS NOTES
[facility] Hematology/Oncology  PROGRESS NOTE       Subjective:       Patient ID:   NAME: Con Stanton : 1930     88 y.o. male    Referring Doc: Cira  Other Physicians: Sonja ([facility]-ENT), Soto Mg (ENT); Regan Mahajan (PCP); Grayson; Evgeny; Kallie Toledo    Chief Complaint:  SCCA esophageal ca f/u    History of Present Illness:     Patient returns today for a regularly scheduled follow-up visit.  The patient is here today to go over the results of the recently ordered labs, tests and studies.he is here with his wife and son. He started keytruda yesterday. He seems to have tolerated it well. He is breathing ok with no changes in coughing. No diarrhea. No CP, or N/V. Mild HA x1. He recent EGD and it was good per patient,          ROS:   GEN: normal without any fever, night sweats or weight loss  HEENT: normal with no HA's, sore throat, stiff neck, changes in vision  CV: normal with no CP, SOB, PND, SCHUMACHER or orthopnea  PULM: normal with no SOB, cough, hemoptysis, sputum or pleuritic pain  GI: normal with no abdominal pain, nausea, vomiting, constipation, diarrhea, melanotic stools, BRBPR, or hematemesis  : normal with no hematuria, dysuria  BREAST: normal with no mass, discharge, pain  SKIN: normal with no rash, erythema, bruising, or swelling    Allergies:  Review of patient's allergies indicates:   Allergen Reactions    Ceftin [cefuroxime axetil]     Codeine     Levaquin [levofloxacin]     Prednisone     Remeron [mirtazapine]     Ultracet [tramadol-acetaminophen]        Medications:    Current Outpatient Medications:     ALPRAZolam (XANAX) 0.25 MG tablet, Take 0.25 mg by mouth 3 (three) times daily., Disp: , Rfl:     amLODIPine (NORVASC) 10 MG tablet, Take 10 mg by mouth once daily., Disp: , Rfl:     aspirin (ECOTRIN) 81 MG EC tablet, Take 81 mg by mouth once daily., Disp: , Rfl:     atorvastatin calcium (LIPITOR ORAL), Take by mouth., Disp: , Rfl:     citalopram (CELEXA) 20 MG  tablet, Take 20 mg by mouth once daily., Disp: , Rfl:     cloNIDine 0.2 mg/24 hr td ptwk (CATAPRES) 0.2 mg/24 hr, Place 1 patch onto the skin every 7 days., Disp: , Rfl:     epoetin sunitha (PROCRIT) 10,000 unit/mL injection, Inject into the skin., Disp: , Rfl:     esomeprazole (NEXIUM) 40 MG capsule, Take 40 mg by mouth before breakfast., Disp: , Rfl:     fenofibrate 160 MG Tab, Take 160 mg by mouth once daily., Disp: , Rfl:     furosemide (LASIX) 20 MG tablet, Take 20 mg by mouth 2 (two) times daily., Disp: , Rfl:     hydrALAZINE (APRESOLINE) 50 MG tablet, Take 50 mg by mouth 3 (three) times daily., Disp: , Rfl:     hydrocodone-acetaminophen (HYCET) solution 7.5-325 mg/15mL, Take 15 mLs by mouth every 4 to 6 hours as needed for Pain., Disp: 750 mL, Rfl: 0    HYDROcodone-acetaminophen (NORCO) 7.5-325 mg per tablet, Take 1 tablet by mouth every 4 to 6 hours as needed for Pain., Disp: 60 tablet, Rfl: 0    insulin lispro (HUMALOG) 100 unit/mL injection, Inject into the skin 3 (three) times daily before meals., Disp: , Rfl:     lidocaine HCl 2% (XYLOCAINE) 2 % Soln, by Mucous Membrane route every 4 (four) hours., Disp: 100 mL, Rfl: 5    linagliptin (TRADJENTA) 5 mg Tab tablet, Take 5 mg by mouth once daily., Disp: , Rfl:     potassium chloride (MICRO-K) 10 MEQ CpSR, Take 10 mEq by mouth once., Disp: , Rfl:     silver sulfADIAZINE 1% (SILVADENE) 1 % cream, Apply topically 2 (two) times daily., Disp: 400 g, Rfl: 2    spironolactone (ALDACTONE) 50 MG tablet, Take 50 mg by mouth once daily., Disp: , Rfl:     terazosin (HYTRIN) 5 MG capsule, Take 5 mg by mouth every evening., Disp: , Rfl:     travoprost, benzalkonium, (TRAVATAN) 0.004 % ophthalmic solution, 1 drop every evening., Disp: , Rfl:     PMHx/PSHx Updates:  See patient's last visit with me on 11/8/2018.  See H&P on 5/9/2018        Pathology:    Left Neck FNA: 4/10/2018  FINAL DIAGNOSIS:  LEFT CERVICAL (NECK) MASS, NEEDLE BIOPSY:    - MODERATELY  DIFFERENTIATED SQUAMOUS CELL CARCINOMA.          Objective:     Vitals:  Blood pressure 120/66, pulse (!) 57, temperature 97.9 °F (36.6 °C), resp. rate 20, weight 84.1 kg (185 lb 6.4 oz).    Physical Examination:   GEN: no apparent distress, comfortable; AAOx3  HEAD: atraumatic and normocephalic  EYES: no pallor, no icterus, PERRLA  ENT: OMM, no pharyngeal erythema, external ears WNL; no nasal discharge; no thrush  NECK: no masses, thyroid normal, trachea midline, no LAD/LN's, supple  CV: RRR with no murmur; normal pulse; normal S1 and S2; no pedal edema  CHEST: Normal respiratory effort; CTAB; normal breath sounds; no wheeze or crackles  ABDOM: nontender and nondistended; soft; normal bowel sounds; no rebound/guarding  MUSC/Skeletal: ROM normal; no crepitus; joints normal; no deformities or arthropathy  EXTREM: no clubbing, cyanosis, inflammation; RUE swelling is much improved as is the erythema  SKIN: no rashes, lesions, ulcers, petechiae or subcutaneous nodules; radiation burn on left neck resolved  : no gregg  NEURO: grossly intact; motor/sensory WNL; AAOx3; no tremors  PSYCH: normal mood, affect and behavior  LYMPH: normal cervical, supraclavicular, axillary and groin LN's            Labs:     11/14/2018    Lab Results   Component Value Date    WBC 3.5 (L) 11/14/2018    HGB 10.2 (L) 11/14/2018    HCT 31.3 (L) 11/14/2018     (L) 11/14/2018     BMP  Lab Results   Component Value Date     11/14/2018    K 5.6 (H) 11/14/2018     11/14/2018    CO2 25.6 11/14/2018    BUN 53 (H) 11/14/2018    CREATININE 1.80 (H) 11/14/2018    CALCIUM 8.9 11/14/2018     Lab Results   Component Value Date    AST 27 11/14/2018    ALKPHOS 51 11/14/2018    BILITOT 0.6 11/14/2018           Radiology/Diagnostic Studies:    PET 10/10/2018:    IMPRESSION:  1. Single enlarged, hypermetabolic leftward posterior mediastinal  paraesophageal lymph node, compatible with metastatic disease.  2. A 10 mm noncalcified pulmonary  nodule in the right lower lobe is not hypermetabolic, but is nonetheless suspicious for metastatic disease. Correlation with prior outside imaging is needed.  3. No additional evidence for metastatic disease.  4. Small bilateral pleural effusions.  5. Additional observations as above.      I have reviewed all available lab results and radiology reports.    Assessment/Plan:   (1) 88 y.o. male with diagnosis of newly diagnosed metastatic squamous cell esophageal CA  who has been referred by Dr Andrews Denis for evaluation by medical hematology/oncology. He originally presented with an enlarging neck mass and was seen by Dr Mg and had an FNA which showed metastatic squamous cell carcinoma. He subsequently saw Dr Casillas at [facility] and had laryngoscope which was essentially unremarkable. PET scan was done on 4/25/2018 with discovery of an upper esophageal primary mass which is suspected primary origin.       - CT scan was also performed which showed lymphadenopathy at level IB measuring 2.8 cm level II measuring 3.5 cm and level III measuring 2.5 cm. The PET scan also confirmed a soft tissue intraluminal esophageal mass at the C6-C7 level measuring 3.4 x 2.9 cm with an SUV of 9.9 consistent with a neoplastic process.     - he had EGD with Dr Arreguin and had biopsy of the esophageal mass and peg tube placed     - He is of advanced age and has numerous co-morbidities including ESRD and CHF. I do not feel he would be a good candidate for chemotherapy or combined modality therapy. Immunologic therapy with Opdivo or Keytruda has not been approved for first line use in Esophageal Squamous Cell carcinomas, but will re-evaluate post monotherapy XRT.      - I previously reviewed the latest NCCN data version 1.2018    - he completed monotherapy with XRT and finished up on 7/3/2018; he seems to have done ok with the XRT and was swallowing with some difficulty and relying on tube feeds  - I discussed with Anita with  nutrition and he passed his swallow test and is doing better overall and maintaining his weight, with expectation that he may be able to have his tube removed in a couple of weeks  - a f/u PET scan had been ordered by Dr Hunt for Oct 10th and I discussed with Dr Hunt the other day about the report  - he unfortunately appears to have some progressive disease  - again, I do not think he would be a good candidate for chemotherapy given his age and co-morbidities  - will look in to getting compassionate use approval for Keytruda  - I discussed the side-effect profiles of immunologic therapies including colitis, transaminitis, and pneumonitis which could be potentially fatal  -  He was recently suppose to start Keytruda on 11/5/2018 but it was held due to HBP and swelling in arms.        (2) ESRD - no on HD - followed by Dr Winkler; he saw Dr Galicia previously and is on IV iron     (3) DM     (4) CHF and CAD s/p CABG x4 in past - followed by Dr Pepper       (5) Chronic anemia - most likely multifactorial process with underlying anemia of chronic disorders and anemia of chronic renal disease at least  - he is on procrit  - hgb is currently 10.2  - required blood transfusion at end of May 2018     (6) Urologic/prostate issues - followed by Dr Toledo - ureter issues; elevated PSA    (7) RUE - swelling improved with antibiotics          Squamous cell esophageal cancer          PLAN:  1. Monitor labs, continue procrit per Dr Galicia's direction; transfuse as needed  2. F/u with ENT with Dr Foy as per their directives  3. continue with keytruda therapy  4. F/u with PCP, Neph, Card, , etc       RTC in 3-4 weeks    Fax note to Keyshawn Denis Tveit, [place] Pepper [place], [place]    Discussion:       I spent over 25 mins of time with the patient. Reviewed results of the recently ordered labs, tests and studies; made directives with regards to the results. Over half of this time was spent couseling  and coordinating care.    I have explained all of the above in detail and the patient understands all of the current recommendation(s). I have answered all of their questions to the best of my ability and to their complete satisfaction.   The patient is to continue with the current management plan.      I discussed the available treatment option(s) in accordance with the latest NCCN Guidelines, their overall age and their co-morbidities. I went over the risks and benefits of the immunologic therapy with or without chemotherapy with regard to their particular cancer type, their cancer stage, their age, and their co-morbidities. I provided literature on the immunologic/chemotherapy regimen and discussed the immunologic/chemotherapy side-effect profiles of the drug(s). I discussed the importance of compliance with obtaining and monitoring weekly lab work, and went over the potential hematopathology issues and risks with anemia, leucopenia and thrombocytopenia that can occur with immunologic/chemotherapy. I discussed the potential risks of liver and kidney damage, which could be permanent and could necessitate dialysis long-term if kidney failure developed. I discussed the emetic and/or diarrheal potential of the regimen and the potential need for use of antiemetic and anti-diarrheal medications. I discussed the risk for development of anaphylactic shock, bronchospasm, dysrhythmia, and respiratory/cardiovascular failure. I discussed the potential risks for development of alopecia, cold sensory issues, ringing in ears, vertigo and neuropathy, all of which could end up being chronic and life-long. I discussed the risks of hand-foot syndrome and rashes, and development of other autoimmune mediated processes such as pneumonitis and colitis which could be life threatening. I discussed the risks of the potential development of leukemia and/or lymphoma from use of certain immunologic/chemotherapy agents. I discussed the need  for neutropenic precautions, basic hygiene/sanitation behaviors and dietary restrictions.    The patient's consent has been obtained to proceed with the immunologic/chemotherapy.The patient will be referred to [facility] for training and education on immunologic/chemotherapy, use of antiemetics and/or anti-diarrheals, use of NSAID's, potential immunologic/chemotherapy side-effects, and any specific recommendations and precautions with the particular immunologic and/or chemotherapy agents.      I answered all of the patient's (and family's, if applicable) questions to the best of my ability and to their complete satisfaction. The patient acknowledged full understanding of the risks, recommendations and plan(s).       Electronically signed by Huey Nieto MD

## 2018-11-20 NOTE — TELEPHONE ENCOUNTER
Labs faxed with note to address TSH  11/19/2018    ----- Message from Huey Nieto MD sent at 11/20/2018  7:58 AM CST -----  Make sure PCP is addressing the thyroid please

## 2018-11-20 NOTE — LETTER
November 20, 2018      Regan Mahajan MD  [address]  [place] [address]           [facility] - Hematology Oncology  [address]  [address]  [place] [address]  Phone: [phone]  Fax: [phone]          Patient: Con Stanton   MR Number: [id number]   YOB: 1930   Date of Visit: 11/20/2018       Dear Dr. Regan Mahajan:    Thank you for referring Con Stanton to me for evaluation. Attached you will find relevant portions of my assessment and plan of care.    If you have questions, please do not hesitate to call me. I look forward to following Con Stanton along with you.    Sincerely,    Huey Nieto MD    Enclosure  CC:  No Recipients    If you would like to receive this communication electronically, please contact [email or web address] or [phone] to request more information on [email or web address] Link access.    For providers and/or their staff who would like to refer a patient to [facility], please contact us through our one-stop-shop provider referral line, [facility], at [phone].    If you feel you have received this communication in error or would no longer like to receive these types of communications, please e-mail [email or web address]

## 2018-12-05 NOTE — TELEPHONE ENCOUNTER
Called patient to increase fluid intake       ----- Message from Huey Nieto MD sent at 12/5/2018  2:43 PM CST -----  Make sure he is staying hydrated - creat is higher

## 2018-12-10 NOTE — LETTER
December 10, 2018      Regan Mahajan MD  [address]  [place] [address]           [facility] - Hematology Oncology  [address]  [address]  [place] [address]  Phone: [phone]  Fax: [phone]          Patient: Con Stanton   MR Number: [id number]   YOB: 1930   Date of Visit: 12/10/2018       Dear Dr. Regan Mahajan:    Thank you for referring Con Stanton to me for evaluation. Attached you will find relevant portions of my assessment and plan of care.    If you have questions, please do not hesitate to call me. I look forward to following Con Stanton along with you.    Sincerely,    Huey Nieto MD    Enclosure  CC:  No Recipients    If you would like to receive this communication electronically, please contact [email or web address] or [phone] to request more information on [employer] Link access.    For providers and/or their staff who would like to refer a patient to [facility], please contact us through our one-stop-shop provider referral line, [facility], at [phone].    If you feel you have received this communication in error or would no longer like to receive these types of communications, please e-mail [email or web address]

## 2018-12-10 NOTE — PROGRESS NOTES
[facility] Hematology/Oncology  PROGRESS NOTE       Subjective:       Patient ID:   NAME: Con Stanton : 1930     88 y.o. male    Referring Doc: Cira  Other Physicians: Sonja ([facility]-ENT), Soto Mg (ENT); Regan Mahajan (PCP); Grayson; Evgeny; Kallie Toledo    Chief Complaint:  SCCA esophageal ca f/u    History of Present Illness:     Patient returns today for a regularly scheduled follow-up visit.  The patient has been on the keytruda.he is here with his wife and son. He seems to be tolerating the therapy well. He is breathing ok with no changes in coughing. No diarrhea. No CP, SOB , HA's or N/V.           ROS:   GEN: normal without any fever, night sweats or weight loss  HEENT: normal with no HA's, sore throat, stiff neck, changes in vision  CV: normal with no CP, SOB, PND, SCHUMACHER or orthopnea  PULM: normal with no SOB, cough, hemoptysis, sputum or pleuritic pain  GI: normal with no abdominal pain, nausea, vomiting, constipation, diarrhea, melanotic stools, BRBPR, or hematemesis  : normal with no hematuria, dysuria  BREAST: normal with no mass, discharge, pain  SKIN: normal with no rash, erythema, bruising, or swelling    Allergies:  Review of patient's allergies indicates:   Allergen Reactions    Ceftin [cefuroxime axetil]     Codeine     Levaquin [levofloxacin]     Prednisone     Remeron [mirtazapine]     Ultracet [tramadol-acetaminophen]        Medications:    Current Outpatient Medications:     ALPRAZolam (XANAX) 0.25 MG tablet, Take 0.25 mg by mouth 3 (three) times daily., Disp: , Rfl:     amLODIPine (NORVASC) 10 MG tablet, Take 10 mg by mouth once daily., Disp: , Rfl:     aspirin (ECOTRIN) 81 MG EC tablet, Take 81 mg by mouth once daily., Disp: , Rfl:     atorvastatin calcium (LIPITOR ORAL), Take by mouth., Disp: , Rfl:     citalopram (CELEXA) 20 MG tablet, Take 20 mg by mouth once daily., Disp: , Rfl:     cloNIDine 0.2 mg/24 hr td ptwk (CATAPRES) 0.2 mg/24 hr, Place 1  patch onto the skin every 7 days., Disp: , Rfl:     epoetin sunitha (PROCRIT) 10,000 unit/mL injection, Inject into the skin., Disp: , Rfl:     esomeprazole (NEXIUM) 40 MG capsule, Take 40 mg by mouth before breakfast., Disp: , Rfl:     fenofibrate 160 MG Tab, Take 160 mg by mouth once daily., Disp: , Rfl:     furosemide (LASIX) 20 MG tablet, Take 20 mg by mouth 2 (two) times daily., Disp: , Rfl:     hydrALAZINE (APRESOLINE) 50 MG tablet, Take 50 mg by mouth 3 (three) times daily., Disp: , Rfl:     hydrocodone-acetaminophen (HYCET) solution 7.5-325 mg/15mL, Take 15 mLs by mouth every 4 to 6 hours as needed for Pain., Disp: 750 mL, Rfl: 0    HYDROcodone-acetaminophen (NORCO) 7.5-325 mg per tablet, Take 1 tablet by mouth every 4 to 6 hours as needed for Pain., Disp: 60 tablet, Rfl: 0    insulin lispro (HUMALOG) 100 unit/mL injection, Inject into the skin 3 (three) times daily before meals., Disp: , Rfl:     lidocaine HCl 2% (XYLOCAINE) 2 % Soln, by Mucous Membrane route every 4 (four) hours., Disp: 100 mL, Rfl: 5    linagliptin (TRADJENTA) 5 mg Tab tablet, Take 5 mg by mouth once daily., Disp: , Rfl:     potassium chloride (MICRO-K) 10 MEQ CpSR, Take 10 mEq by mouth once., Disp: , Rfl:     silver sulfADIAZINE 1% (SILVADENE) 1 % cream, Apply topically 2 (two) times daily., Disp: 400 g, Rfl: 2    spironolactone (ALDACTONE) 50 MG tablet, Take 50 mg by mouth once daily., Disp: , Rfl:     terazosin (HYTRIN) 5 MG capsule, Take 5 mg by mouth every evening., Disp: , Rfl:     travoprost, benzalkonium, (TRAVATAN) 0.004 % ophthalmic solution, 1 drop every evening., Disp: , Rfl:     PMHx/PSHx Updates:  See patient's last visit with me on 11/20/2018.  See H&P on 5/9/2018        Pathology:    Left Neck FNA: 4/10/2018  FINAL DIAGNOSIS:  LEFT CERVICAL (NECK) MASS, NEEDLE BIOPSY:    - MODERATELY DIFFERENTIATED SQUAMOUS CELL CARCINOMA.          Objective:     Vitals:  Blood pressure (!) 149/61, pulse 76, temperature 97.8 °F  (36.6 °C), resp. rate 20, weight 85 kg (187 lb 6.3 oz).    Physical Examination:   GEN: no apparent distress, comfortable; AAOx3  HEAD: atraumatic and normocephalic  EYES: no pallor, no icterus, PERRLA  ENT: OMM, no pharyngeal erythema, external ears WNL; no nasal discharge; no thrush  NECK: no masses, thyroid normal, trachea midline, no LAD/LN's, supple  CV: RRR with no murmur; normal pulse; normal S1 and S2; no pedal edema  CHEST: Normal respiratory effort; CTAB; normal breath sounds; no wheeze or crackles  ABDOM: nontender and nondistended; soft; normal bowel sounds; no rebound/guarding  MUSC/Skeletal: ROM normal; no crepitus; joints normal; no deformities or arthropathy  EXTREM: no clubbing, cyanosis, inflammation; no swelling  SKIN: no rashes, lesions, ulcers, petechiae or subcutaneous nodules; radiation burn on left neck resolved  : no gregg  NEURO: grossly intact; motor/sensory WNL; AAOx3; no tremors  PSYCH: normal mood, affect and behavior  LYMPH: normal cervical, supraclavicular, axillary and groin LN's            Labs:     12/5/2018    Lab Results   Component Value Date    WBC 3.2 (L) 12/05/2018    HGB 9.0 (L) 12/05/2018    HCT 27.3 (L) 12/05/2018     (L) 12/05/2018     BMP  Lab Results   Component Value Date     (L) 12/05/2018    K 4.3 12/05/2018    CL 96 (L) 12/05/2018    CO2 28.4 12/05/2018    BUN 51 (H) 12/05/2018    CREATININE 2.04 (H) 12/05/2018    CALCIUM 8.9 12/05/2018     Lab Results   Component Value Date    AST 23 12/05/2018    ALKPHOS 49 12/05/2018    BILITOT 0.6 12/05/2018           Radiology/Diagnostic Studies:    PET 10/10/2018:    IMPRESSION:  1. Single enlarged, hypermetabolic leftward posterior mediastinal  paraesophageal lymph node, compatible with metastatic disease.  2. A 10 mm noncalcified pulmonary nodule in the right lower lobe is not hypermetabolic, but is nonetheless suspicious for metastatic disease. Correlation with prior outside imaging is needed.  3. No  additional evidence for metastatic disease.  4. Small bilateral pleural effusions.  5. Additional observations as above.      I have reviewed all available lab results and radiology reports.    Assessment/Plan:   (1) 88 y.o. male with diagnosis of newly diagnosed metastatic squamous cell esophageal CA  who has been referred by Dr Andrews Denis for evaluation by medical hematology/oncology. He originally presented with an enlarging neck mass and was seen by Dr Mg and had an FNA which showed metastatic squamous cell carcinoma. He subsequently saw Dr Casillas at [facility] and had laryngoscope which was essentially unremarkable. PET scan was done on 4/25/2018 with discovery of an upper esophageal primary mass which is suspected primary origin.       - CT scan was also performed which showed lymphadenopathy at level IB measuring 2.8 cm level II measuring 3.5 cm and level III measuring 2.5 cm. The PET scan also confirmed a soft tissue intraluminal esophageal mass at the C6-C7 level measuring 3.4 x 2.9 cm with an SUV of 9.9 consistent with a neoplastic process.     - he had EGD with Dr Arreguin and had biopsy of the esophageal mass and peg tube placed     - He is of advanced age and has numerous co-morbidities including ESRD and CHF. I do not feel he would be a good candidate for chemotherapy or combined modality therapy. Immunologic therapy with Opdivo or Keytruda has not been approved for first line use in Esophageal Squamous Cell carcinomas, but will re-evaluate post monotherapy XRT.      - I previously reviewed the latest NCCN data version 1.2018    - he completed monotherapy with XRT and finished up on 7/3/2018; he seems to have done ok with the XRT and was swallowing with some difficulty and relying on tube feeds  - I discussed with Anita with nutrition and he passed his swallow test and is doing better overall and maintaining his weight, with expectation that he may be able to have his tube removed in a  couple of weeks  - a f/u PET scan had been ordered by Dr Hunt for Oct 10th and I discussed with Dr Hunt the other day about the report  - he unfortunately appears to have some progressive disease  - again, I do not think he would be a good candidate for chemotherapy given his age and co-morbidities  - will look in to getting compassionate use approval for Keytruda  - I discussed the side-effect profiles of immunologic therapies including colitis, transaminitis, and pneumonitis which could be potentially fatal  -  He has been on keytruda and seems to be tolerating it well     (2) ESRD - no on HD - followed by Dr Winkler; he saw Dr Galicia previously and is on IV iron     (3) DM     (4) CHF and CAD s/p CABG x4 in past - followed by Dr Pepper       (5) Chronic anemia - most likely multifactorial process with underlying anemia of chronic disorders and anemia of chronic renal disease at least  - he is on procrit  - hgb is currently 10.2  - required blood transfusion at end of May 2018     (6) Urologic/prostate issues - followed by Dr Toledo - ureter issues; elevated PSA    (7) RUE - swelling improved with antibiotics          Squamous cell esophageal cancer          PLAN:  1. Monitor labs, continue procrit per Dr Galicia's direction; transfuse as needed  2. F/u with ENT with Dr Foy as per their directives  3. continue with keytruda therapy  4. F/u with PCP, Neph, Card, , etc       RTC in 3-4 weeks    Fax note to Keyshawn Denis Tveit, Evgeny, [place], Sonja Arreguin    Discussion:       I spent over 25 mins of time with the patient. Reviewed results of the recently ordered labs, tests and studies; made directives with regards to the results. Over half of this time was spent couseling and coordinating care.    I have explained all of the above in detail and the patient understands all of the current recommendation(s). I have answered all of their questions to the best of my ability and to  their complete satisfaction.   The patient is to continue with the current management plan.      I discussed the available treatment option(s) in accordance with the latest NCCN Guidelines, their overall age and their co-morbidities. I went over the risks and benefits of the immunologic therapy with or without chemotherapy with regard to their particular cancer type, their cancer stage, their age, and their co-morbidities. I provided literature on the immunologic/chemotherapy regimen and discussed the immunologic/chemotherapy side-effect profiles of the drug(s). I discussed the importance of compliance with obtaining and monitoring weekly lab work, and went over the potential hematopathology issues and risks with anemia, leucopenia and thrombocytopenia that can occur with immunologic/chemotherapy. I discussed the potential risks of liver and kidney damage, which could be permanent and could necessitate dialysis long-term if kidney failure developed. I discussed the emetic and/or diarrheal potential of the regimen and the potential need for use of antiemetic and anti-diarrheal medications. I discussed the risk for development of anaphylactic shock, bronchospasm, dysrhythmia, and respiratory/cardiovascular failure. I discussed the potential risks for development of alopecia, cold sensory issues, ringing in ears, vertigo and neuropathy, all of which could end up being chronic and life-long. I discussed the risks of hand-foot syndrome and rashes, and development of other autoimmune mediated processes such as pneumonitis and colitis which could be life threatening. I discussed the risks of the potential development of leukemia and/or lymphoma from use of certain immunologic/chemotherapy agents. I discussed the need for neutropenic precautions, basic hygiene/sanitation behaviors and dietary restrictions.    The patient's consent has been obtained to proceed with the immunologic/chemotherapy.The patient will be referred to  [employer] /[facility] for training and education on immunologic/chemotherapy, use of antiemetics and/or anti-diarrheals, use of NSAID's, potential immunologic/chemotherapy side-effects, and any specific recommendations and precautions with the particular immunologic and/or chemotherapy agents.      I answered all of the patient's (and family's, if applicable) questions to the best of my ability and to their complete satisfaction. The patient acknowledged full understanding of the risks, recommendations and plan(s).       Electronically signed by Huey Nieto MD

## 2019-01-01 ENCOUNTER — TELEPHONE (OUTPATIENT)
Dept: HEMATOLOGY/ONCOLOGY | Facility: CLINIC | Age: 84
End: 2019-01-01

## 2019-01-01 ENCOUNTER — OFFICE VISIT (OUTPATIENT)
Dept: HEMATOLOGY/ONCOLOGY | Facility: CLINIC | Age: 84
End: 2019-01-01
Payer: MEDICARE

## 2019-01-01 ENCOUNTER — DOCUMENTATION ONLY (OUTPATIENT)
Dept: RADIATION ONCOLOGY | Facility: CLINIC | Age: 84
End: 2019-01-01

## 2019-01-01 ENCOUNTER — TELEPHONE (OUTPATIENT)
Dept: FAMILY MEDICINE | Facility: CLINIC | Age: 84
End: 2019-01-01

## 2019-01-01 ENCOUNTER — DOCUMENTATION ONLY (OUTPATIENT)
Dept: HEMATOLOGY/ONCOLOGY | Facility: CLINIC | Age: 84
End: 2019-01-01

## 2019-01-01 ENCOUNTER — OFFICE VISIT (OUTPATIENT)
Dept: RADIATION ONCOLOGY | Facility: CLINIC | Age: 84
End: 2019-01-01
Payer: MEDICARE

## 2019-01-01 VITALS
HEART RATE: 62 BPM | SYSTOLIC BLOOD PRESSURE: 123 MMHG | BODY MASS INDEX: 28.44 KG/M2 | DIASTOLIC BLOOD PRESSURE: 47 MMHG | WEIGHT: 176.19 LBS

## 2019-01-01 VITALS
HEART RATE: 72 BPM | TEMPERATURE: 98 F | BODY MASS INDEX: 29.54 KG/M2 | DIASTOLIC BLOOD PRESSURE: 71 MMHG | SYSTOLIC BLOOD PRESSURE: 147 MMHG | RESPIRATION RATE: 20 BRPM | WEIGHT: 183 LBS

## 2019-01-01 VITALS
RESPIRATION RATE: 20 BRPM | TEMPERATURE: 98 F | DIASTOLIC BLOOD PRESSURE: 66 MMHG | HEART RATE: 54 BPM | SYSTOLIC BLOOD PRESSURE: 145 MMHG | BODY MASS INDEX: 28.7 KG/M2 | WEIGHT: 177.81 LBS

## 2019-01-01 VITALS
RESPIRATION RATE: 18 BRPM | HEIGHT: 66 IN | TEMPERATURE: 97 F | BODY MASS INDEX: 27.11 KG/M2 | WEIGHT: 168.69 LBS | DIASTOLIC BLOOD PRESSURE: 53 MMHG | SYSTOLIC BLOOD PRESSURE: 153 MMHG | HEART RATE: 54 BPM

## 2019-01-01 VITALS
WEIGHT: 178.19 LBS | RESPIRATION RATE: 20 BRPM | DIASTOLIC BLOOD PRESSURE: 67 MMHG | SYSTOLIC BLOOD PRESSURE: 146 MMHG | TEMPERATURE: 97 F | BODY MASS INDEX: 28.76 KG/M2 | HEART RATE: 58 BPM

## 2019-01-01 DIAGNOSIS — T45.1X5A ANEMIA ASSOCIATED WITH CHEMOTHERAPY: ICD-10-CM

## 2019-01-01 DIAGNOSIS — D64.81 ANEMIA ASSOCIATED WITH CHEMOTHERAPY: ICD-10-CM

## 2019-01-01 DIAGNOSIS — C15.9 SQUAMOUS CELL ESOPHAGEAL CANCER: ICD-10-CM

## 2019-01-01 DIAGNOSIS — C15.9 SQUAMOUS CELL ESOPHAGEAL CANCER: Primary | ICD-10-CM

## 2019-01-01 DIAGNOSIS — D69.59 CHEMOTHERAPY-INDUCED THROMBOCYTOPENIA: ICD-10-CM

## 2019-01-01 DIAGNOSIS — C79.51 METASTASIS TO BONE: Primary | ICD-10-CM

## 2019-01-01 DIAGNOSIS — D70.1 CHEMOTHERAPY INDUCED NEUTROPENIA: ICD-10-CM

## 2019-01-01 DIAGNOSIS — T45.1X5A CHEMOTHERAPY INDUCED NEUTROPENIA: ICD-10-CM

## 2019-01-01 DIAGNOSIS — R05.9 COUGH: ICD-10-CM

## 2019-01-01 DIAGNOSIS — C79.51 METASTASIS TO BONE: ICD-10-CM

## 2019-01-01 DIAGNOSIS — C15.8 MALIGNANT NEOPLASM OF OVERLAPPING SITES OF ESOPHAGUS: Primary | ICD-10-CM

## 2019-01-01 DIAGNOSIS — R05.8 COUGH WITH SPUTUM: ICD-10-CM

## 2019-01-01 DIAGNOSIS — T45.1X5A CHEMOTHERAPY-INDUCED THROMBOCYTOPENIA: ICD-10-CM

## 2019-01-01 LAB
% SATURATION: 17 %
ALBUMIN SERPL-MCNC: 2.3 G/DL (ref 3.1–4.7)
ALBUMIN SERPL-MCNC: 3.2 G/DL (ref 3.1–4.7)
ALP SERPL-CCNC: 44 IU/L (ref 40–104)
ALP SERPL-CCNC: 59 IU/L (ref 40–104)
ALT (SGPT): 14 IU/L (ref 3–33)
ALT (SGPT): 15 IU/L (ref 3–33)
AST SERPL-CCNC: 20 IU/L (ref 10–40)
AST SERPL-CCNC: 21 IU/L (ref 10–40)
BASOPHILS NFR BLD: 0 %
BASOPHILS NFR BLD: 0 K/UL (ref 0–0.2)
BILIRUB SERPL-MCNC: 0.6 MG/DL (ref 0.3–1)
BILIRUB SERPL-MCNC: 0.8 MG/DL (ref 0.3–1)
BUN SERPL-MCNC: 49 MG/DL (ref 8–20)
BUN SERPL-MCNC: 57 MG/DL (ref 8–20)
CALCIUM SERPL-MCNC: 8.2 MG/DL (ref 7.7–10.4)
CALCIUM SERPL-MCNC: 8.7 MG/DL (ref 7.7–10.4)
CHLORIDE: 96 MMOL/L (ref 98–110)
CHLORIDE: 99 MMOL/L (ref 98–110)
CO2 SERPL-SCNC: 22 MMOL/L (ref 22.8–31.6)
CO2 SERPL-SCNC: 30.5 MMOL/L (ref 22.8–31.6)
CREATININE: 1.58 MG/DL (ref 0.6–1.4)
CREATININE: 2.01 MG/DL (ref 0.6–1.4)
EOSINOPHIL NFR BLD: 0 K/UL (ref 0–0.7)
EOSINOPHIL NFR BLD: 0.4 %
ERYTHROCYTE [DISTWIDTH] IN BLOOD BY AUTOMATED COUNT: 15.2 % (ref 11.7–14.9)
FERRITIN SERPL-MCNC: 365 NG/ML (ref 37–201)
GLUCOSE: 162 MG/DL (ref 70–99)
GLUCOSE: 163 MG/DL (ref 70–99)
GRAN #: 3.5 K/UL (ref 1.4–6.5)
GRAN%: 72.5 %
HCT VFR BLD AUTO: 26.8 % (ref 39–55)
HGB BLD-MCNC: 8.5 G/DL (ref 14–16)
IMMATURE GRANS (ABS): 0 K/UL (ref 0–1)
IMMATURE GRANULOCYTES: 0.4 %
IRON: 28 MCG/DL (ref 32–176)
LYMPH #: 0.8 K/UL (ref 1.2–3.4)
LYMPH%: 16.6 %
MCH RBC QN AUTO: 29.3 PG (ref 25–35)
MCHC RBC AUTO-ENTMCNC: 31.7 G/DL (ref 31–36)
MCV RBC AUTO: 92.4 FL (ref 80–100)
MONO #: 0.5 K/UL (ref 0.1–0.6)
MONO%: 10.1 %
NUCLEATED RBCS: 0 %
PHOSPHATE FLD-MCNC: 3.2 MG/DL (ref 2.5–4.9)
PLATELET # BLD AUTO: 205 K/UL (ref 140–440)
PMV BLD AUTO: 9.4 FL (ref 8.8–12.7)
POTASSIUM SERPL-SCNC: 3.9 MMOL/L (ref 3.5–5)
POTASSIUM SERPL-SCNC: 5 MMOL/L (ref 3.5–5)
PROT SERPL-MCNC: 5.2 G/DL (ref 6–8.2)
PROT SERPL-MCNC: 6.1 G/DL (ref 6–8.2)
RBC # BLD AUTO: 2.9 M/UL (ref 4.3–5.9)
SODIUM: 130 MMOL/L (ref 134–144)
SODIUM: 136 MMOL/L (ref 134–144)
TOTAL IRON BINDING CAPACITY: 165 MCG/DL (ref 177–435)
TSH SERPL DL<=0.005 MIU/L-ACNC: 4.87 ULU/ML (ref 0.3–5.6)
WBC # BLD AUTO: 4.8 K/UL (ref 5–10)

## 2019-01-01 PROCEDURE — 99214 OFFICE O/P EST MOD 30 MIN: CPT | Mod: ,,, | Performed by: INTERNAL MEDICINE

## 2019-01-01 PROCEDURE — 99214 PR OFFICE/OUTPT VISIT, EST, LEVL IV, 30-39 MIN: ICD-10-PCS | Mod: ,,, | Performed by: INTERNAL MEDICINE

## 2019-01-01 PROCEDURE — 99215 PR OFFICE/OUTPT VISIT, EST, LEVL V, 40-54 MIN: ICD-10-PCS | Mod: ,,, | Performed by: RADIOLOGY

## 2019-01-01 PROCEDURE — 99215 OFFICE O/P EST HI 40 MIN: CPT | Mod: ,,, | Performed by: RADIOLOGY

## 2019-01-01 RX ORDER — AMOXICILLIN 500 MG/1
500 CAPSULE ORAL 3 TIMES DAILY
Qty: 30 CAPSULE | Refills: 0 | Status: SHIPPED | OUTPATIENT
Start: 2019-01-01 | End: 2019-01-01

## 2019-01-01 RX ORDER — INSULIN ASPART 100 [IU]/ML
10 INJECTION, SUSPENSION SUBCUTANEOUS NIGHTLY
COMMUNITY

## 2019-01-01 RX ORDER — INSULIN ASPART 100 [IU]/ML
INJECTION, SOLUTION INTRAVENOUS; SUBCUTANEOUS
COMMUNITY

## 2019-01-01 RX ORDER — BUMETANIDE 1 MG/1
1 TABLET ORAL DAILY
COMMUNITY

## 2019-01-01 RX ORDER — INSULIN ASPART 100 [IU]/ML
20 INJECTION, SUSPENSION SUBCUTANEOUS EVERY MORNING
COMMUNITY

## 2019-01-01 RX ORDER — BENZONATATE 100 MG/1
100 CAPSULE ORAL EVERY 6 HOURS PRN
Qty: 30 CAPSULE | Refills: 1 | Status: SHIPPED | OUTPATIENT
Start: 2019-01-01 | End: 2020-03-25

## 2019-01-01 RX ORDER — LIDOCAINE HYDROCHLORIDE 20 MG/ML
SOLUTION OROPHARYNGEAL
Qty: 100 ML | Refills: 5 | Status: SHIPPED | OUTPATIENT
Start: 2019-01-01

## 2019-01-09 NOTE — PROGRESS NOTES
[facility] Hematology/Oncology  PROGRESS NOTE       Subjective:       Patient ID:   NAME: Con Stanton : 1930     88 y.o. male    Referring Doc: Cira  Other Physicians: Sonja ([facility]-ENT), Soto Mg (ENT); Regan Mahajan (PCP); Grayson; Evgeny; Kallie Toledo    Chief Complaint:  SCCA esophageal ca f/u    History of Present Illness:     Patient returns today for a regularly scheduled follow-up visit.  The patient has been on the keytruda. He is here with his wife . He has been having some fatigue, skin rash with itching, some mild SOB, mouth sores and dry mouth. The symptoms seem to have progressed over the past week since the last cycle. He denies any diarrhea or cough. He denies any CP. He has some constipation which is better.         ROS:   GEN: normal without any fever, night sweats or weight loss  HEENT: normal with no HA's, sore throat, stiff neck, changes in vision  CV: normal with no CP, SOB, PND, SCHUMACHER or orthopnea  PULM: normal with no SOB, cough, hemoptysis, sputum or pleuritic pain  GI: normal with no abdominal pain, nausea, vomiting, constipation, diarrhea, melanotic stools, BRBPR, or hematemesis  : normal with no hematuria, dysuria  BREAST: normal with no mass, discharge, pain  SKIN:   Rash,     Allergies:  Review of patient's allergies indicates:   Allergen Reactions    Ceftin [cefuroxime axetil]     Codeine     Levaquin [levofloxacin]     Prednisone     Remeron [mirtazapine]     Ultracet [tramadol-acetaminophen]        Medications:    Current Outpatient Medications:     ALPRAZolam (XANAX) 0.25 MG tablet, Take 0.25 mg by mouth 3 (three) times daily., Disp: , Rfl:     amLODIPine (NORVASC) 10 MG tablet, Take 10 mg by mouth once daily., Disp: , Rfl:     aspirin (ECOTRIN) 81 MG EC tablet, Take 81 mg by mouth once daily., Disp: , Rfl:     atorvastatin calcium (LIPITOR ORAL), Take by mouth., Disp: , Rfl:     citalopram (CELEXA) 20 MG tablet, Take 20 mg by mouth once daily.,  Disp: , Rfl:     cloNIDine 0.2 mg/24 hr td ptwk (CATAPRES) 0.2 mg/24 hr, Place 1 patch onto the skin every 7 days., Disp: , Rfl:     epoetin sunitha (PROCRIT) 10,000 unit/mL injection, Inject into the skin., Disp: , Rfl:     esomeprazole (NEXIUM) 40 MG capsule, Take 40 mg by mouth before breakfast., Disp: , Rfl:     fenofibrate 160 MG Tab, Take 160 mg by mouth once daily., Disp: , Rfl:     furosemide (LASIX) 20 MG tablet, Take 20 mg by mouth 2 (two) times daily., Disp: , Rfl:     hydrALAZINE (APRESOLINE) 50 MG tablet, Take 50 mg by mouth 3 (three) times daily., Disp: , Rfl:     hydrocodone-acetaminophen (HYCET) solution 7.5-325 mg/15mL, Take 15 mLs by mouth every 4 to 6 hours as needed for Pain., Disp: 750 mL, Rfl: 0    HYDROcodone-acetaminophen (NORCO) 7.5-325 mg per tablet, Take 1 tablet by mouth every 4 to 6 hours as needed for Pain., Disp: 60 tablet, Rfl: 0    insulin lispro (HUMALOG) 100 unit/mL injection, Inject into the skin 3 (three) times daily before meals., Disp: , Rfl:     lidocaine HCl 2% (XYLOCAINE) 2 % Soln, by Mucous Membrane route every 4 (four) hours., Disp: 100 mL, Rfl: 5    linagliptin (TRADJENTA) 5 mg Tab tablet, Take 5 mg by mouth once daily., Disp: , Rfl:     potassium chloride (MICRO-K) 10 MEQ CpSR, Take 10 mEq by mouth once., Disp: , Rfl:     silver sulfADIAZINE 1% (SILVADENE) 1 % cream, Apply topically 2 (two) times daily., Disp: 400 g, Rfl: 2    spironolactone (ALDACTONE) 50 MG tablet, Take 50 mg by mouth once daily., Disp: , Rfl:     terazosin (HYTRIN) 5 MG capsule, Take 5 mg by mouth every evening., Disp: , Rfl:     travoprost, benzalkonium, (TRAVATAN) 0.004 % ophthalmic solution, 1 drop every evening., Disp: , Rfl:     PMHx/PSHx Updates:  See patient's last visit with me on 12/10/2018.  See H&P on 5/9/2018        Pathology:    Left Neck FNA: 4/10/2018  FINAL DIAGNOSIS:  LEFT CERVICAL (NECK) MASS, NEEDLE BIOPSY:    - MODERATELY DIFFERENTIATED SQUAMOUS CELL  CARCINOMA.          Objective:     Vitals:  Blood pressure (!) 147/71, pulse 72, temperature 97.5 °F (36.4 °C), resp. rate 20, weight 83 kg (183 lb).    Physical Examination:   GEN: no apparent distress, comfortable; AAOx3  HEAD: atraumatic and normocephalic  EYES: no pallor, no icterus, PERRLA  ENT: OMM, no pharyngeal erythema, external ears WNL; no nasal discharge; no thrush  NECK: no masses, thyroid normal, trachea midline, no LAD/LN's, supple  CV: RRR with no murmur; normal pulse; normal S1 and S2; no pedal edema  CHEST: Normal respiratory effort; CTAB; normal breath sounds; no wheeze or crackles  ABDOM: nontender and nondistended; soft; normal bowel sounds; no rebound/guarding  MUSC/Skeletal: ROM normal; no crepitus; joints normal; no deformities or arthropathy; uses cane to ambulate  EXTREM: no clubbing, cyanosis, inflammation; no swelling  SKIN: rash on bilateral arms;   radiation burn on left neck resolved  : no gregg  NEURO: grossly intact; motor/sensory WNL; AAOx3; no tremors  PSYCH: normal mood, affect and behavior  LYMPH: normal cervical, supraclavicular, axillary and groin LN's            Labs:         12/31/2018 CBC on chart      12/5/2018    Lab Results   Component Value Date    WBC 3.2 (L) 12/05/2018    HGB 9.0 (L) 12/05/2018    HCT 27.3 (L) 12/05/2018     (L) 12/05/2018 1/2/2019  BMP  Lab Results   Component Value Date     (L) 01/02/2019    K 5.0 01/02/2019    CL 99 01/02/2019    CO2 22.0 (L) 01/02/2019    BUN 49 (H) 01/02/2019    CREATININE 2.01 (H) 01/02/2019    CALCIUM 8.7 01/02/2019     Lab Results   Component Value Date    AST 21 01/02/2019    ALKPHOS 44 01/02/2019    BILITOT 0.6 01/02/2019       TSH 0.30 - 5.60 ulU/ml 4.87          Radiology/Diagnostic Studies:    PET 10/10/2018:    IMPRESSION:  1. Single enlarged, hypermetabolic leftward posterior mediastinal  paraesophageal lymph node, compatible with metastatic disease.  2. A 10 mm noncalcified pulmonary nodule in the  right lower lobe is not hypermetabolic, but is nonetheless suspicious for metastatic disease. Correlation with prior outside imaging is needed.  3. No additional evidence for metastatic disease.  4. Small bilateral pleural effusions.  5. Additional observations as above.      I have reviewed all available lab results and radiology reports.    Assessment/Plan:   (1) 88 y.o. male with diagnosis of newly diagnosed metastatic squamous cell esophageal CA  who has been referred by Dr Andrews Denis for evaluation by medical hematology/oncology. He originally presented with an enlarging neck mass and was seen by Dr Mg and had an FNA which showed metastatic squamous cell carcinoma. He subsequently saw Dr Casillas at [facility] and had laryngoscope which was essentially unremarkable. PET scan was done on 4/25/2018 with discovery of an upper esophageal primary mass which is suspected primary origin.       - CT scan was also performed which showed lymphadenopathy at level IB measuring 2.8 cm level II measuring 3.5 cm and level III measuring 2.5 cm. The PET scan also confirmed a soft tissue intraluminal esophageal mass at the C6-C7 level measuring 3.4 x 2.9 cm with an SUV of 9.9 consistent with a neoplastic process.     - he had EGD with Dr Arreguin and had biopsy of the esophageal mass and peg tube placed     - He is of advanced age and has numerous co-morbidities including ESRD and CHF. I do not feel he would be a good candidate for chemotherapy or combined modality therapy. Immunologic therapy with Opdivo or Keytruda has not been approved for first line use in Esophageal Squamous Cell carcinomas, but will re-evaluate post monotherapy XRT.      - I previously reviewed the latest NCCN data version 1.2018    - he completed monotherapy with XRT and finished up on 7/3/2018; he seems to have done ok with the XRT and was swallowing with some difficulty and relying on tube feeds  - I discussed with Anita with nutrition and he  passed his swallow test and is doing better overall and maintaining his weight, with expectation that he may be able to have his tube removed in a couple of weeks  - a f/u PET scan had been ordered by Dr Hunt for Oct 10th and I discussed with Dr Hunt the other day about the report  - he unfortunately appears to have some progressive disease  - again, I do not think he would be a good candidate for chemotherapy given his age and co-morbidities  - will look in to getting compassionate use approval for Keytruda  - I discussed the side-effect profiles of immunologic therapies including colitis, transaminitis, and pneumonitis which could be potentially fatal  -  He has been on keytruda and seemed to be tolerating it well at first but has since developed a host of minor problems including a rash  - will get repeat PET and place keytruda on hold for now     (2) ESRD - no on HD - followed by Dr Winkler; he saw Dr Galicia previously and is on IV iron     (3) DM     (4) CHF and CAD s/p CABG x4 in past - followed by Dr Pepper       (5) Chronic anemia - most likely multifactorial process with underlying anemia of chronic disorders and anemia of chronic renal disease at least  - he is on procrit  - hgb is currently 9.3 from 12/31/2018  - required blood transfusion at end of May 2018     (6) Urologic/prostate issues - followed by Dr Toledo - ureter issues; elevated PSA    (7) RUE - swelling improved with antibiotics    (8) Mild Leucopenia and thrombocytopenia  - wbc at 2.5 and platelet at 136,000 on 12/31/2018          Squamous cell esophageal cancer          PLAN:  1. Monitor labs, continue procrit per Dr Galicia's direction; transfuse as needed  2. F/u with ENT with Dr Foy as per their directives  3. Hold the keytruda therapy for now  4. F/u with PCP, Neph, Card, , etc  5. Set up repeat PET       RTC in 3-4 weeks    Fax note to Keyshawn Denis Tveit, Morales, Neitzschman, Sonja Arreguin    Discussion:        I spent over 25 mins of time with the patient. Reviewed results of the recently ordered labs, tests and studies; made directives with regards to the results. Over half of this time was spent couseling and coordinating care.    I have explained all of the above in detail and the patient understands all of the current recommendation(s). I have answered all of their questions to the best of my ability and to their complete satisfaction.   The patient is to continue with the current management plan.      I discussed the available treatment option(s) in accordance with the latest NCCN Guidelines, their overall age and their co-morbidities. I went over the risks and benefits of the immunologic therapy with or without chemotherapy with regard to their particular cancer type, their cancer stage, their age, and their co-morbidities. I provided literature on the immunologic/chemotherapy regimen and discussed the immunologic/chemotherapy side-effect profiles of the drug(s). I discussed the importance of compliance with obtaining and monitoring weekly lab work, and went over the potential hematopathology issues and risks with anemia, leucopenia and thrombocytopenia that can occur with immunologic/chemotherapy. I discussed the potential risks of liver and kidney damage, which could be permanent and could necessitate dialysis long-term if kidney failure developed. I discussed the emetic and/or diarrheal potential of the regimen and the potential need for use of antiemetic and anti-diarrheal medications. I discussed the risk for development of anaphylactic shock, bronchospasm, dysrhythmia, and respiratory/cardiovascular failure. I discussed the potential risks for development of alopecia, cold sensory issues, ringing in ears, vertigo and neuropathy, all of which could end up being chronic and life-long. I discussed the risks of hand-foot syndrome and rashes, and development of other autoimmune mediated processes such as  pneumonitis and colitis which could be life threatening. I discussed the risks of the potential development of leukemia and/or lymphoma from use of certain immunologic/chemotherapy agents. I discussed the need for neutropenic precautions, basic hygiene/sanitation behaviors and dietary restrictions.    The patient's consent has been obtained to proceed with the immunologic/chemotherapy.The patient will be referred to [facility] for training and education on immunologic/chemotherapy, use of antiemetics and/or anti-diarrheals, use of NSAID's, potential immunologic/chemotherapy side-effects, and any specific recommendations and precautions with the particular immunologic and/or chemotherapy agents.      I answered all of the patient's (and family's, if applicable) questions to the best of my ability and to their complete satisfaction. The patient acknowledged full understanding of the risks, recommendations and plan(s).       Electronically signed by Huey Nieto MD

## 2019-01-09 NOTE — LETTER
January 9, 2019      Viridiana Clark NP  [address] [address]  [address]  [facility]  [place] [address]           [facility] - Hematology Oncology  [address]  [address]  [place] [address]  Phone: [phone]  Fax: [phone]          Patient: Con Stanton   MR Number: [id number]   YOB: 1930   Date of Visit: 1/9/2019       Dear Viridiana Clark:    Thank you for referring Con Stanton to me for evaluation. Attached you will find relevant portions of my assessment and plan of care.    If you have questions, please do not hesitate to call me. I look forward to following Con Stanton along with you.    Sincerely,    Huey Nieto MD    Enclosure  CC:  No Recipients    If you would like to receive this communication electronically, please contact [email or web address] or [phone] to request more information on [facility] [employer] access.    For providers and/or their staff who would like to refer a patient to [facility], please contact us through our one-stop-shop provider referral line, [facility], at [phone].    If you feel you have received this communication in error or would no longer like to receive these types of communications, please e-mail [email or web address]

## 2019-01-09 NOTE — TELEPHONE ENCOUNTER
----- Message from Marj Hines sent at 1/9/2019  3:21 PM CST -----  Contact: WIFE  Wife is requesting a refill of the magic mouthwash    Pharmacy:  [facility]  [phone]    [phone] [place]

## 2019-01-09 NOTE — TELEPHONE ENCOUNTER
Called to see if they called in for a refill on the med's as the last prescriber was Cira and he gave 5 refills.   Pt stated that the rx is only good for 2 wks.    [facility] [address] and spoke w/ Dennis.   Refilled PRN.

## 2019-01-30 NOTE — TELEPHONE ENCOUNTER
Faxed   Report to jaylen      ----- Message from Huey Nieto MD sent at 1/30/2019  1:04 PM CST -----  Fax this to the radiation doc and see if he needs additional XRT

## 2019-02-04 NOTE — PROGRESS NOTES
[facility] Hematology/Oncology  PROGRESS NOTE       Subjective:       Patient ID:   NAME: Con Stanton : 1930     88 y.o. male    Referring Doc: Cira  Other Physicians: Sonja ([facility]-ENT), Soto Mg (ENT); Regan Mahajan (PCP); Grayson; Evgeny; Kallie Toledo    Chief Complaint:  SCCA esophageal ca f/u    History of Present Illness:     Patient returns today for a regularly scheduled follow-up visit.  The patient had previously been on the keytruda. He is here with his wife . He had been having some fatigue, skin rash with itching, some mild SOB, mouth sores and dry mouth. The symptoms had seemed to have had progressed after the last cycle and he subsequently discontinued the regimen. He denies any diarrhea or cough. He denies any CP. He has had some constipation which is better. He is here with his wife and three sons. He had recent TIA this past Friday form which he seems to have resolved.         ROS:   GEN: normal without any fever, night sweats or weight loss  HEENT: normal with no HA's, sore throat, stiff neck, changes in vision; still having throat issues and difficulty swallowing due to dry mouth and soreness but no ulcers  CV: normal with no CP, SOB, PND, SCHUMACHER or orthopnea  PULM: normal with no SOB, cough, hemoptysis, sputum or pleuritic pain  GI: normal with no abdominal pain, nausea, vomiting, constipation, diarrhea, melanotic stools, BRBPR, or hematemesis  : normal with no hematuria, dysuria  BREAST: normal with no mass, discharge, pain  SKIN:   Rash (residual)    Allergies:  Review of patient's allergies indicates:   Allergen Reactions    Ceftin [cefuroxime axetil]     Codeine     Levaquin [levofloxacin]     Prednisone     Remeron [mirtazapine]     Ultracet [tramadol-acetaminophen]        Medications:    Current Outpatient Medications:     (Magic mouthwash) 1:1 Benadryl 12.5mg/5ml liq, mylanta, lidocaine viscous 2%, nystatin 100,000u, prednisolone 15mg/5mL, distilled water,  1 Tbsp Swish and Swallow every 2 hours prn pain, Disp: 300 mL, Rfl: 5    ALPRAZolam (XANAX) 0.25 MG tablet, Take 0.25 mg by mouth 3 (three) times daily., Disp: , Rfl:     amLODIPine (NORVASC) 10 MG tablet, Take 10 mg by mouth once daily., Disp: , Rfl:     aspirin (ECOTRIN) 81 MG EC tablet, Take 81 mg by mouth once daily., Disp: , Rfl:     atorvastatin calcium (LIPITOR ORAL), Take by mouth., Disp: , Rfl:     citalopram (CELEXA) 20 MG tablet, Take 20 mg by mouth once daily., Disp: , Rfl:     cloNIDine 0.2 mg/24 hr td ptwk (CATAPRES) 0.2 mg/24 hr, Place 1 patch onto the skin every 7 days., Disp: , Rfl:     epoetin sunitha (PROCRIT) 10,000 unit/mL injection, Inject into the skin., Disp: , Rfl:     esomeprazole (NEXIUM) 40 MG capsule, Take 40 mg by mouth before breakfast., Disp: , Rfl:     fenofibrate 160 MG Tab, Take 160 mg by mouth once daily., Disp: , Rfl:     furosemide (LASIX) 20 MG tablet, Take 20 mg by mouth 2 (two) times daily., Disp: , Rfl:     hydrALAZINE (APRESOLINE) 50 MG tablet, Take 50 mg by mouth 3 (three) times daily., Disp: , Rfl:     hydrocodone-acetaminophen (HYCET) solution 7.5-325 mg/15mL, Take 15 mLs by mouth every 4 to 6 hours as needed for Pain., Disp: 750 mL, Rfl: 0    HYDROcodone-acetaminophen (NORCO) 7.5-325 mg per tablet, Take 1 tablet by mouth every 4 to 6 hours as needed for Pain., Disp: 60 tablet, Rfl: 0    insulin aspart U-100 (NOVOLOG FLEXPEN U-100 INSULIN) 100 unit/mL InPn pen, Novolog Flexpen U-100 Insulin, Disp: , Rfl:     insulin lispro (HUMALOG) 100 unit/mL injection, Inject into the skin 3 (three) times daily before meals., Disp: , Rfl:     lidocaine HCl 2% (XYLOCAINE) 2 % Soln, by Mucous Membrane route every 4 (four) hours., Disp: 100 mL, Rfl: 5    linagliptin (TRADJENTA) 5 mg Tab tablet, Take 5 mg by mouth once daily., Disp: , Rfl:     potassium chloride (MICRO-K) 10 MEQ CpSR, Take 10 mEq by mouth once., Disp: , Rfl:     silver sulfADIAZINE 1% (SILVADENE) 1 %  cream, Apply topically 2 (two) times daily., Disp: 400 g, Rfl: 2    spironolactone (ALDACTONE) 50 MG tablet, Take 50 mg by mouth once daily., Disp: , Rfl:     terazosin (HYTRIN) 5 MG capsule, Take 5 mg by mouth every evening., Disp: , Rfl:     travoprost, benzalkonium, (TRAVATAN) 0.004 % ophthalmic solution, 1 drop every evening., Disp: , Rfl:     PMHx/PSHx Updates:  See patient's last visit with me on 1/9/2019  See H&P on 5/9/2018        Pathology:    Left Neck FNA: 4/10/2018  FINAL DIAGNOSIS:  LEFT CERVICAL (NECK) MASS, NEEDLE BIOPSY:    - MODERATELY DIFFERENTIATED SQUAMOUS CELL CARCINOMA.          Objective:     Vitals:  Blood pressure (!) 146/67, pulse (!) 58, temperature 97.4 °F (36.3 °C), resp. rate 20, weight 80.8 kg (178 lb 3.2 oz).    Physical Examination:   GEN: no apparent distress, comfortable; AAOx3  HEAD: atraumatic and normocephalic  EYES: no pallor, no icterus, PERRLA  ENT: OMM, no pharyngeal erythema, external ears WNL; no nasal discharge; no thrush  NECK: no masses, thyroid normal, trachea midline, no LAD/LN's, supple  CV: RRR with no murmur; normal pulse; normal S1 and S2; no pedal edema  CHEST: Normal respiratory effort; CTAB; normal breath sounds; no wheeze or crackles  ABDOM: nontender and nondistended; soft; normal bowel sounds; no rebound/guarding  MUSC/Skeletal: ROM normal; no crepitus; joints normal; no deformities or arthropathy; uses cane to ambulate  EXTREM: no clubbing, cyanosis, inflammation; no swelling  SKIN: rash on bilateral arms;   radiation burn on left neck resolved  : no gregg  NEURO: grossly intact; motor/sensory WNL; AAOx3; no tremors  PSYCH: normal mood, affect and behavior  LYMPH: normal cervical, supraclavicular, axillary and groin LN's            Labs:         1/24/2019      Lab Results   Component Value Date    WBC 3.7 (L) 01/24/2019    HGB 10.2 (L) 01/24/2019    HCT 32.7 (L) 01/24/2019     (L) 01/24/2019 1/2/2019  BMP  Lab Results   Component Value  Date     (L) 01/02/2019    K 5.0 01/02/2019    CL 99 01/02/2019    CO2 22.0 (L) 01/02/2019    BUN 49 (H) 01/02/2019    CREATININE 2.01 (H) 01/02/2019    CALCIUM 8.7 01/02/2019     Lab Results   Component Value Date    AST 21 01/02/2019    ALKPHOS 44 01/02/2019    BILITOT 0.6 01/02/2019       TSH 0.30 - 5.60 ulU/ml 4.87          Radiology/Diagnostic Studies:    PET/CT  1/30/2019    IMPRESSION:  1. Focal FDG avid focus adjacent to the mid esophagus, consistent with local metastatic disease, unchanged in size and FDG activity when compared to the previous exam.  2. New lytic FDG avid focus along the inferior most left scapular spine.  3. New groundglass attenuation nodule in the left upper lobe with minimal increased FDG activity from background.  4. Small bilateral pleural effusions and adjacent atelectasis.  5. Additional and incidental findings as noted above.            PET 10/10/2018:    IMPRESSION:  1. Single enlarged, hypermetabolic leftward posterior mediastinal  paraesophageal lymph node, compatible with metastatic disease.  2. A 10 mm noncalcified pulmonary nodule in the right lower lobe is not hypermetabolic, but is nonetheless suspicious for metastatic disease. Correlation with prior outside imaging is needed.  3. No additional evidence for metastatic disease.  4. Small bilateral pleural effusions.  5. Additional observations as above.      I have reviewed all available lab results and radiology reports.    Assessment/Plan:   (1) 88 y.o. male with diagnosis of newly diagnosed metastatic squamous cell esophageal CA  who has been referred by Dr Andrews Denis for evaluation by medical hematology/oncology. He originally presented with an enlarging neck mass and was seen by Dr Mg and had an FNA which showed metastatic squamous cell carcinoma. He subsequently saw Dr Casillas at [facility] and had laryngoscope which was essentially unremarkable. PET scan was done on 4/25/2018 with discovery of an upper  esophageal primary mass which is suspected primary origin.       - CT scan was also performed which showed lymphadenopathy at level IB measuring 2.8 cm level II measuring 3.5 cm and level III measuring 2.5 cm. The PET scan also confirmed a soft tissue intraluminal esophageal mass at the C6-C7 level measuring 3.4 x 2.9 cm with an SUV of 9.9 consistent with a neoplastic process.     - he had EGD with Dr Arreguin and had biopsy of the esophageal mass and peg tube placed     - He is of advanced age and has numerous co-morbidities including ESRD and CHF. I do not feel he would be a good candidate for chemotherapy or combined modality therapy. Immunologic therapy with Opdivo or Keytruda has not been approved for first line use in Esophageal Squamous Cell carcinomas, but will re-evaluate post monotherapy XRT.      - I previously reviewed the latest NCCN data version 1.2018    - he completed monotherapy with XRT and finished up on 7/3/2018; he seems to have done ok with the XRT and was swallowing with some difficulty and relying on tube feeds  - I discussed with Anita with nutrition and he passed his swallow test and is doing better overall and maintaining his weight, with expectation that he may be able to have his tube removed in a couple of weeks  - a f/u PET scan had been ordered by Dr Hunt for Oct 10th and I discussed with Dr Hunt the other day about the report  - he unfortunately appears to have some progressive disease  - again, I do not think he would be a good candidate for chemotherapy given his age and co-morbidities  - will look in to getting compassionate use approval for Keytruda  - I discussed the side-effect profiles of immunologic therapies including colitis, transaminitis, and pneumonitis which could be potentially fatal  -  He has been on keytruda and seemed to be tolerating it well at first but has since developed a host of minor problems including a rash  - keytruda has subsequently been on hold  for now  - repeat PEt scan was done on 1/30/2019 and the paraesophageal nodule is stable but there may be a new lytic lesion in the left scapula  - we had long talk about changing gears to quality of life and they are agreeable; I do not think he can tolerate any additional or different forms of chemotherapy  - he sees Dr Hunt feb 20th      (2) ESRD - no on HD - followed by Dr Winkler; he saw Dr Galicia previously and is on IV iron     (3) DM     (4) CHF and CAD s/p CABG x4 in past - followed by Dr Pepper       (5) Chronic anemia - most likely multifactorial process with underlying anemia of chronic disorders and anemia of chronic renal disease at least  - he is on procrit  - hgb is currently 10.2  - required blood transfusion at end of May 2018     (6) Urologic/prostate issues - followed by Dr Toledo - ureter issues; elevated PSA    (7) RUE - swelling resolved s/p antibiotics    (8) Mild Leucopenia and thrombocytopenia  - wbc at 3.7 and better and platelet at 128,000    (9) recent TIA which resolved - he has to f/u with neuro and optho; he was inpatient at [facility]        Squamous cell esophageal cancer    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 Monitor labs, continue procrit per Dr Galicia's direction; transfuse as needed  2. F/u with ENT with Dr Foy as per their directives  3. Hold the keytruda therapy indefinitely  4. F/u with Dr Mahan to see if he could benefit from any XRT to the scapula  5. Need records from [facility]  6. F/u with PCP, Neph, Card, , etc         RTC in 3-4 weeks    Fax note to Keyshawn Denis Tveit, Evgeny, [place], Sonja Arreguin    Discussion:       I spent over 25 mins of time with the patient. Reviewed results of the recently ordered labs, tests and studies; made directives with regards to the results. Over half of this time was spent  couseling and coordinating care.    I have explained all of the above in detail and the patient understands all of the current recommendation(s). I have answered all of their questions to the best of my ability and to their complete satisfaction.   The patient is to continue with the current management plan.      I discussed the available treatment option(s) in accordance with the latest NCCN Guidelines, their overall age and their co-morbidities. I went over the risks and benefits of the immunologic therapy with or without chemotherapy with regard to their particular cancer type, their cancer stage, their age, and their co-morbidities. I provided literature on the immunologic/chemotherapy regimen and discussed the immunologic/chemotherapy side-effect profiles of the drug(s). I discussed the importance of compliance with obtaining and monitoring weekly lab work, and went over the potential hematopathology issues and risks with anemia, leucopenia and thrombocytopenia that can occur with immunologic/chemotherapy. I discussed the potential risks of liver and kidney damage, which could be permanent and could necessitate dialysis long-term if kidney failure developed. I discussed the emetic and/or diarrheal potential of the regimen and the potential need for use of antiemetic and anti-diarrheal medications. I discussed the risk for development of anaphylactic shock, bronchospasm, dysrhythmia, and respiratory/cardiovascular failure. I discussed the potential risks for development of alopecia, cold sensory issues, ringing in ears, vertigo and neuropathy, all of which could end up being chronic and life-long. I discussed the risks of hand-foot syndrome and rashes, and development of other autoimmune mediated processes such as pneumonitis and colitis which could be life threatening. I discussed the risks of the potential development of leukemia and/or lymphoma from use of certain immunologic/chemotherapy agents. I discussed  the need for neutropenic precautions, basic hygiene/sanitation behaviors and dietary restrictions.    The patient's consent has been obtained to proceed with the immunologic/chemotherapy.The patient will be referred to [facility] /[facility] for training and education on immunologic/chemotherapy, use of antiemetics and/or anti-diarrheals, use of NSAID's, potential immunologic/chemotherapy side-effects, and any specific recommendations and precautions with the particular immunologic and/or chemotherapy agents.      I answered all of the patient's (and family's, if applicable) questions to the best of my ability and to their complete satisfaction. The patient acknowledged full understanding of the risks, recommendations and plan(s).       Electronically signed by Huey Nieto MD

## 2019-02-05 PROBLEM — D70.1 CHEMOTHERAPY INDUCED NEUTROPENIA: Status: ACTIVE | Noted: 2019-01-01

## 2019-02-05 PROBLEM — T45.1X5A ANEMIA ASSOCIATED WITH CHEMOTHERAPY: Status: ACTIVE | Noted: 2019-01-01

## 2019-02-05 PROBLEM — D69.59 CHEMOTHERAPY-INDUCED THROMBOCYTOPENIA: Status: ACTIVE | Noted: 2019-01-01

## 2019-02-05 PROBLEM — D64.81 ANEMIA ASSOCIATED WITH CHEMOTHERAPY: Status: ACTIVE | Noted: 2019-01-01

## 2019-02-05 PROBLEM — C79.51 METASTASIS TO BONE: Status: ACTIVE | Noted: 2019-01-01

## 2019-02-05 PROBLEM — T45.1X5A CHEMOTHERAPY-INDUCED THROMBOCYTOPENIA: Status: ACTIVE | Noted: 2019-01-01

## 2019-02-05 PROBLEM — T45.1X5A CHEMOTHERAPY INDUCED NEUTROPENIA: Status: ACTIVE | Noted: 2019-01-01

## 2019-02-05 NOTE — LETTER
February 5, 2019      Viridiana Clark NP  [address] [address]  [address]  [facility]  [place] [address]           [facility] - Hematology Oncology  [address]  [address]  [facility] [place] [address]  Phone: [phone]  Fax: [phone]          Patient: Con Stanton   MR Number: [id number]   YOB: 1930   Date of Visit: 2/5/2019       Dear Viridiana Clark:    Thank you for referring Con Stanton to me for evaluation. Attached you will find relevant portions of my assessment and plan of care.    If you have questions, please do not hesitate to call me. I look forward to following Con Stanton along with you.    Sincerely,    Huey Nieto MD    Enclosure  CC:  No Recipients    If you would like to receive this communication electronically, please contact [email or web address] or [phone] to request more information on [facility] access.    For providers and/or their staff who would like to refer a patient to [facility], please contact us through our one-stop-shop provider referral line, [facility], at [phone].    If you feel you have received this communication in error or would no longer like to receive these types of communications, please e-mail [email or web address]

## 2019-02-20 NOTE — PROGRESS NOTES
Con Stanton  [id number]  8/14/1930 2/20/2019  No referring provider defined for this encounter.    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 Mg for sinus problems and a left-sided neck mass was discovered. He underwent a fine-needle aspirate of the neck mass making a diagnosis squamous cell carcinoma.  He presented to Dr. Foy, for workup which included a PET scan dated 04/25/2018. This study revealed multiple lymph nodes in the neck specifically to large hypermetabolic level II nodes consistent with neoplastic disease a CT scan was also performed which showed lymphadenopathy at level IB measuring 2.8 cm level II measuring 3.5 cm and level III measuring 2.5 cm. The PET scan also confirmed a soft tissue intraluminal esophageal mass at the C6-C7 level measuring 3.4 x 2.9 cm with an SUV of 9.9 consistent with a neoplastic process.  There is no evidence of distant metastatic disease.    Dr. 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    Patient was treated with definitive radiotherapy alone, 70 gray to neck disease, 60 gray to gross disease at esophagus, 56 gray B elective necks ending 7/18.  Patient was treated with a PEG tube in placed by the end of therapy this is his main source of nutrition. He did have significant radiation dermatitis as well as mucositis extending through the oropharynx. He had significant fatigue but tolerated therapy very well without treatment breaks. He was very determined and had excellent support.    10/18 PET/CT  1. Single enlarged, hypermetabolic leftward posterior mediastinal paraesophageal lymph node, compatible with metastatic disease.  2. A 10 mm noncalcified pulmonary nodule in the right lower lobe is not  "hypermetabolic, but is nonetheless suspicious for metastatic disease.    Patient was placed on Keytruda, with poor tolerance.     He presents with updated PET CT scan.    INTERVAL HISTORY:   Today patient endorses continued pain and discomfort with swallowing responsive to Magic mouthwash. He denies cough, shortness of breath or hemoptysis. He denies bone pain. He reports having "fluid in the ears" compromising his hearing for which she is seeing Dr. Mg. He has a follow-up endoscopy with Dr. Arreguin scheduled. He continues with xerostomia and mild dysgeusia. His wife reports he has sores in the mouth and is not doing rinses.    Review of Systems   Constitutional: Positive for appetite change and fatigue. Negative for chills, fever and unexpected weight change.   HENT:   Positive for hearing loss, sore throat, trouble swallowing and voice change. Negative for lump/mass and mouth sores.    Eyes: Negative for eye problems and icterus.   Respiratory: Negative for chest tightness, cough, hemoptysis and shortness of breath.    Cardiovascular: Negative for chest pain and leg swelling.   Gastrointestinal: Negative for abdominal distention, abdominal pain, blood in stool, constipation, diarrhea, nausea and vomiting.   Genitourinary: Negative for bladder incontinence, difficulty urinating, dysuria, frequency, hematuria and nocturia.    Musculoskeletal: Positive for neck stiffness. Negative for back pain, gait problem and neck pain.   Neurological: Negative for extremity weakness, gait problem, headaches, numbness and seizures.   Hematological: Negative for adenopathy.     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     Past Surgical History:   Procedure Laterality Date    APPENDECTOMY      BOWEL RESECTION      CORONARY ANGIOPLASTY WITH STENT PLACEMENT      INGUINAL HERNIA REPAIR      CT CABG, ARTERY-VEIN, FOUR      Coronary Artery Bypass, 4    PROSTATE SURGERY       Social History     Socioeconomic History    Marital status:      Spouse name: None    Number of children: None    Years of education: None    Highest education level: None   Social Needs    Financial resource strain: None    Food insecurity - worry: None    Food insecurity - inability: None    Transportation needs - medical: None    Transportation needs - non-medical: None   Occupational History    None   Tobacco Use    Smoking status: Never Smoker    Smokeless tobacco: Never Used   Substance and Sexual Activity    Alcohol use: No    Drug use: No    Sexual activity: None   Other Topics Concern    None   Social History Narrative    None     History reviewed. No pertinent family history.  Medication List with Changes/Refills   Current Medications    (MAGIC MOUTHWASH) 1:1 BENADRYL 12.5MG/5ML LIQ, MYLANTA, LIDOCAINE VISCOUS 2%, NYSTATIN 100,000U, PREDNISOLONE 15MG/5ML, DISTILLED WATER    1 Tbsp Swish and Swallow every 2 hours prn pain    ALPRAZOLAM (XANAX) 0.25 MG TABLET    Take 0.25 mg by mouth 3 (three) times daily.    AMLODIPINE (NORVASC) 10 MG TABLET    Take 10 mg by mouth once daily.    ASPIRIN (ECOTRIN) 81 MG EC TABLET    Take 81 mg by mouth once daily.    ATORVASTATIN CALCIUM (LIPITOR ORAL)    Take by mouth.    CITALOPRAM (CELEXA) 20 MG TABLET    Take 20 mg by mouth once daily.    CLONIDINE 0.2 MG/24 HR TD PTWK (CATAPRES) 0.2 MG/24 HR    Place 1 patch onto the skin every 7 days.    EPOETIN JOE (PROCRIT) 10,000 UNIT/ML INJECTION    Inject into the skin.    ESOMEPRAZOLE (NEXIUM) 40 MG CAPSULE    Take 40 mg by mouth before breakfast.    FENOFIBRATE 160 MG TAB    Take 160 mg by mouth once daily.    FUROSEMIDE (LASIX)  "20 MG TABLET    Take 20 mg by mouth 2 (two) times daily.    HYDRALAZINE (APRESOLINE) 50 MG TABLET    Take 50 mg by mouth 3 (three) times daily.    HYDROCODONE-ACETAMINOPHEN (HYCET) SOLUTION 7.5-325 MG/15ML    Take 15 mLs by mouth every 4 to 6 hours as needed for Pain.    HYDROCODONE-ACETAMINOPHEN (NORCO) 7.5-325 MG PER TABLET    Take 1 tablet by mouth every 4 to 6 hours as needed for Pain.    INSULIN ASPART U-100 (NOVOLOG FLEXPEN U-100 INSULIN) 100 UNIT/ML INPN PEN    Novolog Flexpen U-100 Insulin    INSULIN LISPRO (HUMALOG) 100 UNIT/ML INJECTION    Inject into the skin 3 (three) times daily before meals.    LIDOCAINE HCL 2% (XYLOCAINE) 2 % SOLN    by Mucous Membrane route every 4 (four) hours.    LINAGLIPTIN (TRADJENTA) 5 MG TAB TABLET    Take 5 mg by mouth once daily.    POTASSIUM CHLORIDE (MICRO-K) 10 MEQ CPSR    Take 10 mEq by mouth once.    SILVER SULFADIAZINE 1% (SILVADENE) 1 % CREAM    Apply topically 2 (two) times daily.    SPIRONOLACTONE (ALDACTONE) 50 MG TABLET    Take 50 mg by mouth once daily.    TERAZOSIN (HYTRIN) 5 MG CAPSULE    Take 5 mg by mouth every evening.    TRAVOPROST, BENZALKONIUM, (TRAVATAN) 0.004 % OPHTHALMIC SOLUTION    1 drop every evening.     Review of patient's allergies indicates:   Allergen Reactions    Ceftin [cefuroxime axetil]     Codeine     Levaquin [levofloxacin]     Prednisone     Remeron [mirtazapine]     Ultracet [tramadol-acetaminophen]        QUALITY OF LIFE: 70%    Vitals:    02/20/19 0956   BP: (!) 153/53   Pulse: (!) 54   Resp: 18   Temp: 97.3 °F (36.3 °C)   TempSrc: Oral   Weight: 76.5 kg (168 lb 11.2 oz)   Height: 5' 6" (1.676 m)   PainSc: 0-No pain       PHYSICAL EXAM:   GENERAL: alert; in no apparent distress.   HEAD: normocephalic, atraumatic.  EYES: pupils are equal, round, reactive to light and accommodation. Sclera anicteric. Conjunctiva not injected.   NOSE/THROAT: no nasal erythema or rhinorrhea. Mucositis at right oral tongue  NECK: no cervical motion " rigidity; left mid neck firmness proximal   CHEST: Patient is speaking comfortably on room air with normal work of breathing without using accessory muscles of respiration.  ABDOMEN: soft, nontender, nondistended. Bowel sounds present.   MUSCULOSKELETAL: no tenderness to palpation along the spine or scapulae. Normal range of motion.  NEUROLOGIC: cranial nerves II-XII intact bilaterally. Strength 5/5 in bilateral upper and lower extremities. No sensory deficits appreciated. Normal gait.  LYMPHATIC: No palpable lymphadenopathy  EXTREMITIES: no clubbing, cyanosis, edema.  SKIN: Mild to moderate fibrosis of the bilateral neck    ANCILLARY DATA:   1/19 PET/CT  1. Focal FDG avid focus adjacent to the mid esophagus, consistent with local  metastatic disease, unchanged in size and FDG activity when compared to the  previous exam.  2. New lytic FDG avid focus along the inferior most left scapular spine.  3. New groundglass attenuation nodule in the left upper lobe with minimal  increased FDG activity from background.  4. Small bilateral pleural effusions and adjacent atelectasis.  5. Additional and incidental findings as noted above.    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  PLAN:  Mr. Stanton was very vigilant throughout his radiotherapy and did maintain his weight relatively well. Follow-up PET/CT scan demonstrated out of field lymphadenopathy for which he was placed on Keytruda, ultimately with poor tolerance and discontinuation. Follow-up PET/CT scan demonstrates persistent FDG uptake at that site as well as a subcentimeter focus the left scapular spine and left upper lobe concerning for progressive disease. Patient is without complaints at this time specifically denying pain in the left scapula or range of motion deficits. Given his weakness and continued difficulty with  swallowing and by mouth intake I feel is best to defer palliative treatment at this time. He will continue to follow with Dr. Arreguin for endoscopy to assess the primary site and I recommended he continue with Magic mouthwash prior to feeding. I also recommended baking soda/salt rinses 4-6 times daily to assist with mucositis at the tongue. He will continue to follow Dr. Nieto although he is presently not receiving systemic therapy. I recommended every 3-6 month surveillance scans as well as physician follow-up to assess for new symptoms. Presently no role for palliative radiotherapy. Recommendation discussed thoroughly with family who is in agreement. I'll see him back in 3 months.    All questions answered and contact information provided. Patient understands free to call us anytime with any questions or concerns regarding radiation therapy.    I have personally seen and evaluated this patient. Greater than 50% of this time was spent discussing coordination of care and/or counseling.    PHYSICIAN: Juan Hunt Jr, MD

## 2019-03-02 NOTE — TELEPHONE ENCOUNTER
Huey,    Coverage for Dr. Nieto.    [facility].   called me that creatinine was 2.28 and K was 6.1.    In reviewing his chart, creatinine was 2.0 and K was 5 recently.    I called Mr Stanton and spoke with him and his son.  I advised that renal function appeared to have deteriorated and K was high, not dangerously high, but we needed to bring it down.    He is not on a KCL pill.    I recommended he come to the [facility] ER for evaluation and treatment.  He may need to be admitted for hydration and    Treatment of hyperkalemia.    I spoke with ER MD and nurse supervisor about the disposition.   Pharmacy Vancomycin Initial Note  Date of Service 2017  Patient's  [id number]  89 year old, female    Indication: [facility] Pneumonia and MRSA    Current estimated CrCl = Estimated Creatinine Clearance: 27.9 mL/min (based on Cr of 1.38).    Creatinine for last 3 days  2017:  1:14 PM Creatinine 1.38 mg/dL    Recent Vancomycin Level(s) for last 3 days  No results found for requested labs within last 72 hours.      Vancomycin IV Administrations (past 72 hours)                   vancomycin (VANCOCIN) 1500 mg in 0.9% NaCl 250 mL PREMIX (mg) 1,500 mg New Bag 17 1522                Nephrotoxins and other renal medications (Future)    Start     Dose/Rate Route Frequency Ordered Stop    17 1500  vancomycin (VANCOCIN) 1500 mg in 0.9% NaCl 250 mL PREMIX      1,500 mg Intravenous EVERY 48 HOURS 17 1826      17 2130  piperacillin-tazobactam (ZOSYN) 3.375 g vial to attach to  mL bag     Comments:  Pharmacy can adjust dose based on renal function.    2.25 g  over 30 Minutes Intravenous EVERY 6 HOURS 17 1728            Contrast Orders - past 72 hours     None                Plan:  1.  Start vancomycin  1500 mg IV q48h.  For crcl = 27.9ml/min.  Will review clearance daily.    2.  Goal Trough Level: 15-20 mg/L   3.  Pharmacy will check trough levels as appropriate in 3-5 Days or as deemed appropriate.    4. Serum creatinine levels will be ordered daily for the first week of therapy and at least twice weekly for subsequent weeks.    5. [place] method utilized to dose vancomycin therapy: Method 1    Piotr Arguello

## 2019-03-02 NOTE — PROGRESS NOTES
Huey,    Coverage for Dr. Nieto.    [facility]   called me that creatinine was 2.28 and K was 6.1.    In reviewing his chart, creatinine was 2.0 and K was 5 recently.    I called Mr Stanton and spoke with him and his son.  I advised that renal function appeared to have deteriorated and K was high, not dangerously high, but we needed to bring it down.    He is not on a KCL pill.    I recommended he come to the [facility] ER for evaluation and treatment.  He may need to be admitted for hydration and    Treatment of hyperkalemia.    I spoke with ER MD and nurse supervisor about the disposition.

## 2019-03-06 NOTE — TELEPHONE ENCOUNTER
Called talked to kassandra donnelly nurse and gave order from [facility] that patient is to get procrit 10,000 units SQ

## 2019-03-26 NOTE — PROGRESS NOTES
NUTRITION NOTE    Mr. Dill was getting ketruda.  Has metastasis to bone.  This is on hold for now.  He was recently hospitalized for hyperkalemia.    K: 6.1  GFR: 28      Plan: Educated on 2000 mg. Potassium restricted diet.  2. Gave USDA list of potassium content in foods.  3.  Advised he see a renal dietitian at Dr. Tim office.

## 2019-03-26 NOTE — PROGRESS NOTES
[facility] Hematology/Oncology  PROGRESS NOTE       Subjective:       Patient ID:   NAME: Con Stanton : 1930     88 y.o. male    Referring Doc: Cira  Other Physicians: Sonja ([facility]), Soto Mg (ENT); Regan Mahajan (PCP); Grayson; Evgeny; Kallie Toledo    Chief Complaint:  SCCA esophageal ca f/u    History of Present Illness:     Patient returns today for a regularly scheduled follow-up visit.    He has been off the keytruda and seems to be feeling better .  He denies any CP, SOB, HA's or N/V. He has had some constipation which is better. He is here with his wife and three sons. He was recently hospitalized with hyperkalemia. He is still having some swallowing issues. He has some coughing with some greenish sputum. He saw Dr Foy in 2019.         ROS:   GEN: normal without any fever, night sweats or weight loss  HEENT: normal with no HA's, sore throat, stiff neck, changes in vision; still having throat issues and difficulty swallowing due to dry mouth and soreness but no ulcers  CV: normal with no CP, SOB, PND, SCHUMACHER or orthopnea  PULM: chronic cough with occasional green sputum  GI: normal with no abdominal pain, nausea, vomiting, constipation, diarrhea, melanotic stools, BRBPR, or hematemesis  : normal with no hematuria, dysuria  BREAST: normal with no mass, discharge, pain  SKIN:   Rash  resolving    Allergies:  Review of patient's allergies indicates:   Allergen Reactions    Ceftin [cefuroxime axetil]     Codeine     Levaquin [levofloxacin]     Prednisone     Remeron [mirtazapine]     Ultracet [tramadol-acetaminophen]        Medications:    Current Outpatient Medications:     (Magic mouthwash) 1:1 Benadryl 12.5mg/5ml liq, mylanta, lidocaine viscous 2%, nystatin 100,000u, prednisolone 15mg/5mL, distilled water, 1 Tbsp Swish and Swallow every 2 hours prn pain, Disp: 300 mL, Rfl: 5    ALPRAZolam (XANAX) 0.25 MG tablet, Take 0.25 mg by mouth 3 (three) times daily.,  Disp: , Rfl:     amLODIPine (NORVASC) 10 MG tablet, Take 10 mg by mouth once daily., Disp: , Rfl:     aspirin (ECOTRIN) 81 MG EC tablet, Take 81 mg by mouth once daily., Disp: , Rfl:     atorvastatin calcium (LIPITOR ORAL), Take by mouth., Disp: , Rfl:     bumetanide (BUMEX) 1 MG tablet, Take 1 mg by mouth once daily., Disp: , Rfl:     citalopram (CELEXA) 20 MG tablet, Take 20 mg by mouth once daily., Disp: , Rfl:     cloNIDine 0.2 mg/24 hr td ptwk (CATAPRES) 0.2 mg/24 hr, Place 1 patch onto the skin every 7 days., Disp: , Rfl:     clopidogrel bisulfate (PLAVIX ORAL), Take 75 mg by mouth every evening., Disp: , Rfl:     epoetin sunitha (PROCRIT) 10,000 unit/mL injection, Inject into the skin., Disp: , Rfl:     esomeprazole (NEXIUM) 40 MG capsule, Take 40 mg by mouth before breakfast., Disp: , Rfl:     fenofibrate 160 MG Tab, Take 160 mg by mouth once daily., Disp: , Rfl:     hydrALAZINE (APRESOLINE) 50 MG tablet, Take 50 mg by mouth 3 (three) times daily., Disp: , Rfl:     hydrocodone-acetaminophen (HYCET) solution 7.5-325 mg/15mL, Take 15 mLs by mouth every 4 to 6 hours as needed for Pain., Disp: 750 mL, Rfl: 0    HYDROcodone-acetaminophen (NORCO) 7.5-325 mg per tablet, Take 1 tablet by mouth every 4 to 6 hours as needed for Pain., Disp: 60 tablet, Rfl: 0    insulin asp prt-insulin aspart, NOVOLOG 70/30, (NOVOLOG MIX 70-30 U-100 INSULN) 100 unit/mL (70-30) Soln, Inject 20 Units into the skin every morning., Disp: , Rfl:     insulin aspart protamine-insulin aspart (NOVOLOG 70/30) 100 unit/mL (70-30) InPn pen, Inject 10 Units into the skin every evening., Disp: , Rfl:     insulin aspart U-100 (NOVOLOG FLEXPEN U-100 INSULIN) 100 unit/mL InPn pen, Novolog Flexpen U-100 Insulin, Disp: , Rfl:     insulin lispro (HUMALOG) 100 unit/mL injection, Inject into the skin 3 (three) times daily before meals., Disp: , Rfl:     lidocaine HCl 2% (XYLOCAINE) 2 % Soln, One teaspoon every 2 hours as needed for swallowing  pain, Disp: 100 mL, Rfl: 5    linagliptin (TRADJENTA) 5 mg Tab tablet, Take 5 mg by mouth once daily., Disp: , Rfl:     potassium chloride (MICRO-K) 10 MEQ CpSR, Take 10 mEq by mouth once., Disp: , Rfl:     silver sulfADIAZINE 1% (SILVADENE) 1 % cream, Apply topically 2 (two) times daily., Disp: 400 g, Rfl: 2    spironolactone (ALDACTONE) 50 MG tablet, Take 50 mg by mouth once daily., Disp: , Rfl:     terazosin (HYTRIN) 5 MG capsule, Take 5 mg by mouth every evening., Disp: , Rfl:     travoprost, benzalkonium, (TRAVATAN) 0.004 % ophthalmic solution, 1 drop every evening., Disp: , Rfl:     furosemide (LASIX) 20 MG tablet, Take 20 mg by mouth 2 (two) times daily., Disp: , Rfl:     PMHx/PSHx Updates:  See patient's last visit with me on 2/5/2019  See H&P on 5/9/2018        Pathology:    Left Neck FNA: 4/10/2018  FINAL DIAGNOSIS:  LEFT CERVICAL (NECK) MASS, NEEDLE BIOPSY:    - MODERATELY DIFFERENTIATED SQUAMOUS CELL CARCINOMA.          Objective:     Vitals:  Blood pressure (!) 145/66, pulse (!) 54, temperature 97.6 °F (36.4 °C), temperature source Oral, resp. rate 20, weight 80.6 kg (177 lb 12.8 oz).    Physical Examination:   GEN: no apparent distress, comfortable; AAOx3  HEAD: atraumatic and normocephalic  EYES: no pallor, no icterus, PERRLA  ENT: OMM, no pharyngeal erythema, external ears WNL; no nasal discharge; no thrush  NECK: no masses, thyroid normal, trachea midline, no LAD/LN's, supple  CV: RRR with no murmur; normal pulse; normal S1 and S2; no pedal edema  CHEST: Normal respiratory effort; CTAB; normal breath sounds; no wheeze or crackles  ABDOM: nontender and nondistended; soft; normal bowel sounds; no rebound/guarding  MUSC/Skeletal: ROM normal; no crepitus; joints normal; no deformities or arthropathy; uses cane to ambulate  EXTREM: no clubbing, cyanosis, inflammation; no swelling  SKIN: rash resolving on bilateral arms;   radiation burn on left neck resolved  : no gregg  NEURO: grossly intact;  motor/sensory WNL; AAOx3; no tremors  PSYCH: normal mood, affect and behavior  LYMPH: normal cervical, supraclavicular, axillary and groin LN's            Labs:         3/1/2019      Lab Results   Component Value Date    WBC 3.6 (L) 03/01/2019    HGB 9.0 (L) 03/01/2019    HCT 28.2 (L) 03/01/2019     (L) 03/01/2019 1/2/2019  BMP  Lab Results   Component Value Date     (L) 03/01/2019    K 6.1 (HH) 03/01/2019     03/01/2019    CO2 23.0 03/01/2019    BUN 52 (H) 03/01/2019    CREATININE 2.28 (H) 03/01/2019    CALCIUM 8.9 03/01/2019     Lab Results   Component Value Date    AST 19 03/01/2019    ALKPHOS 50 03/01/2019    BILITOT 0.7 03/01/2019                  Radiology/Diagnostic Studies:    PET/CT  1/30/2019    IMPRESSION:  1. Focal FDG avid focus adjacent to the mid esophagus, consistent with local metastatic disease, unchanged in size and FDG activity when compared to the previous exam.  2. New lytic FDG avid focus along the inferior most left scapular spine.  3. New groundglass attenuation nodule in the left upper lobe with minimal increased FDG activity from background.  4. Small bilateral pleural effusions and adjacent atelectasis.  5. Additional and incidental findings as noted above.            PET 10/10/2018:    IMPRESSION:  1. Single enlarged, hypermetabolic leftward posterior mediastinal  paraesophageal lymph node, compatible with metastatic disease.  2. A 10 mm noncalcified pulmonary nodule in the right lower lobe is not hypermetabolic, but is nonetheless suspicious for metastatic disease. Correlation with prior outside imaging is needed.  3. No additional evidence for metastatic disease.  4. Small bilateral pleural effusions.  5. Additional observations as above.      I have reviewed all available lab results and radiology reports.    Assessment/Plan:   (1) 88 y.o. male with diagnosis of newly diagnosed metastatic squamous cell esophageal CA  who has been referred by Dr Andrews Denis  for evaluation by medical hematology/oncology. He originally presented with an enlarging neck mass and was seen by Dr Mg and had an FNA which showed metastatic squamous cell carcinoma. He subsequently saw Dr Casillas at [facility] and had laryngoscope which was essentially unremarkable. PET scan was done on 4/25/2018 with discovery of an upper esophageal primary mass which is suspected primary origin.       - CT scan was also performed which showed lymphadenopathy at level IB measuring 2.8 cm level II measuring 3.5 cm and level III measuring 2.5 cm. The PET scan also confirmed a soft tissue intraluminal esophageal mass at the C6-C7 level measuring 3.4 x 2.9 cm with an SUV of 9.9 consistent with a neoplastic process.     - he had EGD with Dr Arreguin and had biopsy of the esophageal mass and peg tube placed     - He is of advanced age and has numerous co-morbidities including ESRD and CHF. I do not feel he would be a good candidate for chemotherapy or combined modality therapy. Immunologic therapy with Opdivo or Keytruda has not been approved for first line use in Esophageal Squamous Cell carcinomas, but will re-evaluate post monotherapy XRT.      - I previously reviewed the latest NCCN data version 1.2018    - he completed monotherapy with XRT and finished up on 7/3/2018; he seems to have done ok with the XRT and was swallowing with some difficulty and relying on tube feeds  - I discussed with Anita with nutrition and he passed his swallow test and is doing better overall and maintaining his weight, with expectation that he may be able to have his tube removed in a couple of weeks  - a f/u PET scan had been ordered by Dr Hunt for Oct 10th and I discussed with Dr Hunt the other day about the report  - he unfortunately appears to have some progressive disease  - again, I do not think he would be a good candidate for chemotherapy given his age and co-morbidities  - will look in to getting compassionate use  approval for Keytruda  - I discussed the side-effect profiles of immunologic therapies including colitis, transaminitis, and pneumonitis which could be potentially fatal  -  He has been on keytruda and seemed to be tolerating it well at first but has since developed a host of minor problems including a rash  - keytruda has subsequently been on hold for now  - repeat PEt scan was done on 1/30/2019 and the paraesophageal nodule is stable but there may be a new lytic lesion in the left scapula  - we had long talk about changing gears to quality of life and they are agreeable; I do not think he can tolerate any additional or different forms of chemotherapy  - he saw Dr Hunt Feb 20th 2019 but they are going to hold off on XRT at this time due to the small size of the lesion      (2) ESRD - no on HD - followed by Dr Winkler; he saw Dr Galicia previously and is on IV iron     (3) DM     (4) CHF and CAD s/p CABG x4 in past - followed by Dr Pepper       (5) Chronic anemia - most likely multifactorial process with underlying anemia of chronic disorders and anemia of chronic renal disease at least  - he is on procrit  - hgb is currently 10.2  - required blood transfusion at end of May 2018     (6) Urologic/prostate issues - followed by Dr Toledo - ureter issues; elevated PSA    (7) RUE - swelling resolved s/p antibiotics    (8) Mild Leucopenia and thrombocytopenia  - wbc at 3.6 and better and platelet at 119,000    (9) recent TIA which resolved - he has to f/u with neuro and optho; he was inpatient at [facility]    (10) Potassium issues - recently hospitalized        Squamous cell esophageal cancer    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 Monitor labs, continue procrit per Dr Galicia's direction; transfuse as needed  2. F/u with ENT with Dr Foy as per their directives  3. Hold the keytruda therapy indefinitely - I would not  recommend consideration for any other immunologic or chemotherapy given his advanced age and inability to tolerate the drugs  4. Start amoxicillin po antibiotic for 10 days  5. Start tessalon perrls for cough     F/u with PCP, Neph, Card, , etc   RTC in 4 weeks    Fax note to Keyshawn Denis Tveit, Morales, Neitzschman, Albright, [place]lander    Discussion:       I spent over 25 mins of time with the patient. Reviewed results of the recently ordered labs, tests and studies; made directives with regards to the results. Over half of this time was spent couseling and coordinating care.    I have explained all of the above in detail and the patient understands all of the current recommendation(s). I have answered all of their questions to the best of my ability and to their complete satisfaction.   The patient is to continue with the current management plan.      I discussed the available treatment option(s) in accordance with the latest NCCN Guidelines, their overall age and their co-morbidities. I went over the risks and benefits of the immunologic therapy with or without chemotherapy with regard to their particular cancer type, their cancer stage, their age, and their co-morbidities. I provided literature on the immunologic/chemotherapy regimen and discussed the immunologic/chemotherapy side-effect profiles of the drug(s). I discussed the importance of compliance with obtaining and monitoring weekly lab work, and went over the potential hematopathology issues and risks with anemia, leucopenia and thrombocytopenia that can occur with immunologic/chemotherapy. I discussed the potential risks of liver and kidney damage, which could be permanent and could necessitate dialysis long-term if kidney failure developed. I discussed the emetic and/or diarrheal potential of the regimen and the potential need for use of antiemetic and anti-diarrheal medications. I discussed the risk for development of anaphylactic shock,  bronchospasm, dysrhythmia, and respiratory/cardiovascular failure. I discussed the potential risks for development of alopecia, cold sensory issues, ringing in ears, vertigo and neuropathy, all of which could end up being chronic and life-long. I discussed the risks of hand-foot syndrome and rashes, and development of other autoimmune mediated processes such as pneumonitis and colitis which could be life threatening. I discussed the risks of the potential development of leukemia and/or lymphoma from use of certain immunologic/chemotherapy agents. I discussed the need for neutropenic precautions, basic hygiene/sanitation behaviors and dietary restrictions.    The patient's consent has been obtained to proceed with the immunologic/chemotherapy.The patient will be referred to [facility] /[facility] for training and education on immunologic/chemotherapy, use of antiemetics and/or anti-diarrheals, use of NSAID's, potential immunologic/chemotherapy side-effects, and any specific recommendations and precautions with the particular immunologic and/or chemotherapy agents.      I answered all of the patient's (and family's, if applicable) questions to the best of my ability and to their complete satisfaction. The patient acknowledged full understanding of the risks, recommendations and plan(s).       Electronically signed by Huey Nieto MD

## 2019-03-26 NOTE — LETTER
March 26, 2019      Viridiana Clark NP  [address] [address]  [address]  [facility]  [place] [address]           [facility] - Hematology Oncology  [address]  [address]  [facility] [address]  Phone: [phone]  Fax: [phone]          Patient: Con Stanton   MR Number: [id number]   YOB: 1930   Date of Visit: 3/26/2019       Dear Viridiana Clark:    Thank you for referring Con Stanton to me for evaluation. Attached you will find relevant portions of my assessment and plan of care.    If you have questions, please do not hesitate to call me. I look forward to following Con Stanton along with you.    Sincerely,    Huey Nieto MD    Enclosure  CC:  No Recipients    If you would like to receive this communication electronically, please contact [email or web address] or [phone] to request more information on [employer] Link access.    For providers and/or their staff who would like to refer a patient to [facility], please contact us through our one-stop-shop provider referral line, [facility], at [phone].    If you feel you have received this communication in error or would no longer like to receive these types of communications, please e-mail [email or web address]

## 2019-04-05 NOTE — TELEPHONE ENCOUNTER
Called talked to wife  About yasmeen being in hospital     ----- Message from Marj Hines sent at 4/5/2019  1:46 PM CDT -----  Pt is admitted to [facility].  Pts spouse is requesting a call back to fill you in with the particulars surrounding his admittance.    # [phone]

## 2019-04-22 NOTE — TELEPHONE ENCOUNTER
Called wife back to talk about patient in rehab     ----- Message from Aniyah Mcgovern sent at 4/22/2019  9:17 AM CDT -----  The patient's wife called and said she would like to speak to Jack or Dr Nieto in reference to his neurostimulator which is to help him swallow. Please call her back at [phone].

## 2019-05-22 NOTE — PROGRESS NOTES
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 Mg for sinus problems and a left-sided neck mass was discovered. He underwent a fine-needle aspirate of the neck mass making a diagnosis squamous cell carcinoma.  He presented to Dr. Foy, for workup which included a PET scan dated 04/25/2018. This study revealed multiple lymph nodes in the neck specifically to large hypermetabolic level II nodes consistent with neoplastic disease a CT scan was also performed which showed lymphadenopathy at level IB measuring 2.8 cm level II measuring 3.5 cm and level III measuring 2.5 cm. The PET scan also confirmed a soft tissue intraluminal esophageal mass at the C6-C7 level measuring 3.4 x 2.9 cm with an SUV of 9.9 consistent with a neoplastic process.  There is no evidence of distant metastatic disease.    Dr. 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    Patient was treated with definitive radiotherapy alone, 70 gray to neck disease, 60 gray to gross disease at esophagus, 56 gray B elective necks ending 7/18.  Patient was treated with a PEG tube in placed by the end of therapy this is his main source of nutrition. He did have significant radiation dermatitis as well as mucositis extending through the oropharynx. He had significant fatigue but tolerated therapy very well without treatment breaks. He was very determined and had excellent support.    10/18 PET/CT  1. Single enlarged, hypermetabolic leftward posterior mediastinal paraesophageal lymph node, compatible with metastatic disease.  2. A 10 mm noncalcified pulmonary nodule in the  right lower lobe is not hypermetabolic, but is nonetheless suspicious for metastatic disease.    Patient was placed on Keytruda, with poor tolerance. Updated PET CT scan demonstrated small lytic lesion of the left scapular spine and we deferred palliative treatment at that time.    INTERVAL HISTORY:   Patient is not receiving antineoplastic therapy. He has been hospitalized, rehabilitation facility and now at [facility] working with PT/OT. He is on oxygen. He is having difficulty with bedsores specifically of the right heel. He continues to be very weak. Today he's complaining of some discomfort in the left shoulder but denies pain elsewhere. He is looking for to returning home.    Review of Systems   Constitutional: Positive for appetite change and fatigue. Negative for chills, fever and unexpected weight change.   HENT:   Positive for hearing loss, sore throat, trouble swallowing and voice change. Negative for lump/mass and mouth sores.    Eyes: Negative for eye problems and icterus.   Respiratory: Negative for chest tightness, cough, hemoptysis and shortness of breath.    Cardiovascular: Negative for chest pain and leg swelling.   Gastrointestinal: Negative for abdominal distention, abdominal pain, blood in stool, constipation, diarrhea, nausea and vomiting.   Genitourinary: Negative for bladder incontinence, difficulty urinating, dysuria, frequency, hematuria and nocturia.    Musculoskeletal: Positive for arthralgias and neck stiffness. Negative for back pain, gait problem and neck pain.   Neurological: Negative for extremity weakness, gait problem, headaches, numbness and seizures.   Hematological: Negative for adenopathy.     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     Past Surgical History:   Procedure Laterality Date    APPENDECTOMY      BOWEL RESECTION      CORONARY ANGIOPLASTY WITH STENT PLACEMENT      INGUINAL HERNIA REPAIR      IN CABG, ARTERY-VEIN, FOUR      Coronary Artery Bypass, 4    PROSTATE SURGERY       Social History     Socioeconomic History    Marital status:      Spouse name: Not on file    Number of children: Not on file    Years of education: Not on file    Highest education level: Not on file   Occupational History    Not on file   Social Needs    Financial resource strain: Not on file    Food insecurity:     Worry: Not on file     Inability: Not on file    Transportation needs:     Medical: Not on file     Non-medical: Not on file   Tobacco Use    Smoking status: Never Smoker    Smokeless tobacco: Never Used   Substance and Sexual Activity    Alcohol use: No    Drug use: No    Sexual activity: Not on file   Lifestyle    Physical activity:     Days per week: Not on file     Minutes per session: Not on file    Stress: Not on file   Relationships    Social connections:     Talks on phone: Not on file     Gets together: Not on file     Attends Gnosticism service: Not on file     Active member of club or organization: Not on file     Attends meetings of clubs or organizations: Not on file     Relationship status: Not on file   Other Topics Concern    Not on file   Social History Narrative    Not on file     No family history on file.  Medication List with Changes/Refills   Current Medications    (MAGIC MOUTHWASH) 1:1 BENADRYL 12.5MG/5ML LIQ, MYLANTA, LIDOCAINE VISCOUS 2%, NYSTATIN 100,000U, PREDNISOLONE 15MG/5ML, DISTILLED WATER    1 Tbsp Swish and Swallow every 2 hours prn pain    ALPRAZOLAM (XANAX) 0.25 MG TABLET    Take 0.25 mg by mouth 3 (three) times daily.    AMLODIPINE (NORVASC) 10 MG TABLET    Take 10 mg by mouth once daily.     ASPIRIN (ECOTRIN) 81 MG EC TABLET    Take 81 mg by mouth once daily.    ATORVASTATIN CALCIUM (LIPITOR ORAL)    Take by mouth.    BENZONATATE (TESSALON PERLES) 100 MG CAPSULE    Take 1 capsule (100 mg total) by mouth every 6 (six) hours as needed for Cough.    BUMETANIDE (BUMEX) 1 MG TABLET    Take 1 mg by mouth once daily.    CITALOPRAM (CELEXA) 20 MG TABLET    Take 20 mg by mouth once daily.    CLONIDINE 0.2 MG/24 HR TD PTWK (CATAPRES) 0.2 MG/24 HR    Place 1 patch onto the skin every 7 days.    CLOPIDOGREL BISULFATE (PLAVIX ORAL)    Take 75 mg by mouth every evening.    EPOETIN JOE (PROCRIT) 10,000 UNIT/ML INJECTION    Inject into the skin.    ESOMEPRAZOLE (NEXIUM) 40 MG CAPSULE    Take 40 mg by mouth before breakfast.    FENOFIBRATE 160 MG TAB    Take 160 mg by mouth once daily.    FUROSEMIDE (LASIX) 20 MG TABLET    Take 20 mg by mouth 2 (two) times daily.    HYDRALAZINE (APRESOLINE) 50 MG TABLET    Take 50 mg by mouth 3 (three) times daily.    HYDROCODONE-ACETAMINOPHEN (HYCET) SOLUTION 7.5-325 MG/15ML    Take 15 mLs by mouth every 4 to 6 hours as needed for Pain.    HYDROCODONE-ACETAMINOPHEN (NORCO) 7.5-325 MG PER TABLET    Take 1 tablet by mouth every 4 to 6 hours as needed for Pain.    INSULIN ASP PRT-INSULIN ASPART, NOVOLOG 70/30, (NOVOLOG MIX 70-30 U-100 INSULN) 100 UNIT/ML (70-30) SOLN    Inject 20 Units into the skin every morning.    INSULIN ASPART PROTAMINE-INSULIN ASPART (NOVOLOG 70/30) 100 UNIT/ML (70-30) INPN PEN    Inject 10 Units into the skin every evening.    INSULIN ASPART U-100 (NOVOLOG FLEXPEN U-100 INSULIN) 100 UNIT/ML INPN PEN    Novolog Flexpen U-100 Insulin    INSULIN LISPRO (HUMALOG) 100 UNIT/ML INJECTION    Inject into the skin 3 (three) times daily before meals.    LIDOCAINE HCL 2% (XYLOCAINE) 2 % SOLN    One teaspoon every 2 hours as needed for swallowing pain    LINAGLIPTIN (TRADJENTA) 5 MG TAB TABLET    Take 5 mg by mouth once daily.    POTASSIUM CHLORIDE (MICRO-K) 10 MEQ CPSR     Take 10 mEq by mouth once.    SILVER SULFADIAZINE 1% (SILVADENE) 1 % CREAM    Apply topically 2 (two) times daily.    SPIRONOLACTONE (ALDACTONE) 50 MG TABLET    Take 50 mg by mouth once daily.    TERAZOSIN (HYTRIN) 5 MG CAPSULE    Take 5 mg by mouth every evening.    TRAVOPROST, BENZALKONIUM, (TRAVATAN) 0.004 % OPHTHALMIC SOLUTION    1 drop every evening.     Review of patient's allergies indicates:   Allergen Reactions    Ceftin [cefuroxime axetil]     Codeine     Levaquin [levofloxacin]     Prednisone     Remeron [mirtazapine]     Ultracet [tramadol-acetaminophen]        QUALITY OF LIFE: 60%    Vitals:    05/22/19 1049   BP: (!) 123/47   Pulse: 62   Weight: 79.9 kg (176 lb 3.2 oz)       PHYSICAL EXAM:   GENERAL: alert; in no apparent distress.   HEAD: normocephalic, atraumatic.  EYES: pupils are equal, round, reactive to light and accommodation. Sclera anicteric. Conjunctiva not injected.   NOSE/THROAT: no nasal erythema or rhinorrhea. Mucositis at right oral tongue  NECK: no cervical motion rigidity; left mid neck firmness proximal   CHEST: On supplement oxygen ×2 L with bibasilar crackles, no wheeze  ABDOMEN: soft, nontender, nondistended. Bowel sounds present. Feeding tube present  MUSCULOSKELETAL: no tenderness to palpation along the spine or scapulae. Mild limitation at left shoulder range of motion  NEUROLOGIC: cranial nerves II-XII intact bilaterally. Strength 5/5 in bilateral upper and lower extremities. No sensory deficits appreciated. In wheelchair.  LYMPHATIC: No palpable lymphadenopathy  EXTREMITIES: no clubbing, cyanosis, edema.  SKIN: Mild to moderate fibrosis of the bilateral neck    ANCILLARY DATA:   1/19 PET/CT  1. Focal FDG avid focus adjacent to the mid esophagus, consistent with local  metastatic disease, unchanged in size and FDG activity when compared to the  previous exam.  2. New lytic FDG avid focus along the inferior most left scapular spine.  3. New groundglass attenuation nodule  in the left upper lobe with minimal  increased FDG activity from background.  4. Small bilateral pleural effusions and adjacent atelectasis.  5. Additional and incidental findings as noted above.    ASSESSMENT: 87M with stage IV SCCa prox esophagus (no U/S stage) s/p definitive RT alone ending 7/18 followed by Keytruda with poor tolerance.  PLAN:  Mr. Stanton was very vigilant throughout his radiotherapy and did maintain his weight relatively well. Follow-up PET/CT scan demonstrated out of field lymphadenopathy for which he was placed on Keytruda, ultimately with poor tolerance and discontinuation. Follow-up PET/CT scan demonstrates persistent FDG uptake at that site as well as a subcentimeter focus the left scapular spine and left upper lobe concerning for progressive disease but given asymptomatic presentation palliative therapy was deferred at that time. He has since been hospitalized with transfer to rehabilitation facility and now to a nursing facility. He continues work with PT/OT and has consultation with pulmonology for recurrent pneumonia. I recommended they discuss goals of care at the facility so that patient can understand what is required before recommended discharge. He will also continue to work with pulmonology regarding oxygen requirements. I recommended he continue to follow with Dr. Nieto and return to clinic in 3 months. No need for follow-up imaging at this time. Patient is not a candidate for antineoplastic therapy but may require palliative radiotherapy at a later date if his KPS improves and new lesions declare with symptoms.    All questions answered and contact information provided. Patient understands free to call us anytime with any questions or concerns regarding radiation therapy.    I have personally seen and evaluated this patient. Greater than 50% of this time was spent discussing coordination of care and/or counseling.    PHYSICIAN: Juan Hunt Jr, MD

## 2019-06-10 NOTE — TELEPHONE ENCOUNTER
Getting harvey to get him infor an appointment     ----- Message from Huey Nieto MD sent at 6/10/2019  1:52 PM CDT -----  His hgb is a little low - does he want to get a unit or two of blood

## 2019-06-10 NOTE — TELEPHONE ENCOUNTER
Called pt to schedule a follow up apt, but spouse informed me that pt is now on hospice. (Lynette Workman)

## 2019-07-22 NOTE — TELEPHONE ENCOUNTER
----- Message from Larry Puri sent at 7/22/2019  4:36 PM CDT -----  Contact: pt friend Moshe  Terminally ill  Form for hospice. Moshe is requesting that the provider re-sign the form. Moshe states the legal department will arrange a notary to be present.    Moshe spoke with Nuria in legal Department this morning.  Legal department: [phone]    Call Back #: [phone]  Thanks

## 2019-07-22 NOTE — TELEPHONE ENCOUNTER
Spoke to patients friend advised Dr Yung is out of clinic   Stated he will contact the legal department

## 2019-07-29 NOTE — TELEPHONE ENCOUNTER
----- Message from Maribel Lobo sent at 7/29/2019  1:18 PM CDT -----  Contact: Friend, Moshe Mesa want to speak with a nurse regarding filling out legal paperwork for patient need to make arrangements for patient wife to come please call back at [phone]

## 2022-08-10 NOTE — TELEPHONE ENCOUNTER
NUTRITION FOLLOW UP- POST TREATMENT    Mr. Stanton had his Modified Barium Swallow Study on 8/16/18.  [facility] emailed me to let me know that he passed his swallow study.      Plan:Called Mr. Stanton to see how much he is eating and how often he is using his PEG.    He is eating 3 meals a day and drinking glucerna milkshake.  He still gets 1 can of Diabetasource via his PEG.  He is able to maintain his weight at 170#.  He is able to tolerate soft foods, has difficulty swallowing meat.  He is not able to open his mouth wide enough to get in a sandwich.      2. Advised Mr. Stanton that if he is able to maintain his weight without using his PEG for 2 weeks, then the feeding tube can be removed.  He has an appointment with Dr. Nieto today and I will share progress with Dr. Nieto.       Rhofade Pregnancy And Lactation Text: This medication has not been assigned a Pregnancy Risk Category. It is unknown if the medication is excreted in breast milk.